# Patient Record
Sex: FEMALE | Race: WHITE | Employment: FULL TIME | URBAN - METROPOLITAN AREA
[De-identification: names, ages, dates, MRNs, and addresses within clinical notes are randomized per-mention and may not be internally consistent; named-entity substitution may affect disease eponyms.]

---

## 2017-02-23 ENCOUNTER — HOSPITAL ENCOUNTER (OUTPATIENT)
Age: 22
Setting detail: SPECIMEN
Discharge: HOME | End: 2017-02-23

## 2017-02-23 ENCOUNTER — NURSE ONLY (OUTPATIENT)
Dept: LAB | Age: 22
End: 2017-02-23

## 2017-02-23 ENCOUNTER — OFFICE VISIT (OUTPATIENT)
Dept: OBGYN | Age: 22
End: 2017-02-23

## 2017-02-23 VITALS
RESPIRATION RATE: 16 BRPM | HEIGHT: 60 IN | DIASTOLIC BLOOD PRESSURE: 80 MMHG | BODY MASS INDEX: 39.46 KG/M2 | SYSTOLIC BLOOD PRESSURE: 118 MMHG | WEIGHT: 201 LBS | HEART RATE: 76 BPM

## 2017-02-23 DIAGNOSIS — Z01.419 ENCOUNTER FOR ANNUAL ROUTINE GYNECOLOGICAL EXAMINATION: ICD-10-CM

## 2017-02-23 DIAGNOSIS — N92.1 METRORRHAGIA: Primary | ICD-10-CM

## 2017-02-23 DIAGNOSIS — N94.6 DYSMENORRHEA: Primary | ICD-10-CM

## 2017-02-23 LAB
DIRECT EXAM: NORMAL
HCG QUALITATIVE: NEGATIVE
Lab: NORMAL
SPECIMEN DESCRIPTION: NORMAL
SPECIMEN DESCRIPTION: NORMAL
STATUS: NORMAL

## 2017-02-23 PROCEDURE — 87800 DETECT AGNT MULT DNA DIREC: CPT

## 2017-02-23 PROCEDURE — 87491 CHLMYD TRACH DNA AMP PROBE: CPT

## 2017-02-23 PROCEDURE — 99395 PREV VISIT EST AGE 18-39: CPT | Performed by: OBSTETRICS & GYNECOLOGY

## 2017-02-23 PROCEDURE — 87070 CULTURE OTHR SPECIMN AEROBIC: CPT

## 2017-02-23 PROCEDURE — 36415 COLL VENOUS BLD VENIPUNCTURE: CPT

## 2017-02-23 PROCEDURE — 84703 CHORIONIC GONADOTROPIN ASSAY: CPT

## 2017-02-23 PROCEDURE — 87591 N.GONORRHOEAE DNA AMP PROB: CPT

## 2017-02-23 PROCEDURE — 96372 THER/PROPH/DIAG INJ SC/IM: CPT | Performed by: OBSTETRICS & GYNECOLOGY

## 2017-02-23 RX ORDER — MEDROXYPROGESTERONE ACETATE 150 MG/ML
150 INJECTION, SUSPENSION INTRAMUSCULAR
Status: CANCELLED | OUTPATIENT
Start: 2017-02-23

## 2017-02-23 RX ORDER — MEDROXYPROGESTERONE ACETATE 150 MG/ML
150 INJECTION, SUSPENSION INTRAMUSCULAR ONCE
Qty: 1 ML | Refills: 1
Start: 2017-02-23 | End: 2017-02-23 | Stop reason: SDUPTHER

## 2017-02-23 RX ORDER — MEDROXYPROGESTERONE ACETATE 150 MG/ML
150 INJECTION, SUSPENSION INTRAMUSCULAR
Status: COMPLETED | OUTPATIENT
Start: 2017-02-23 | End: 2017-05-15

## 2017-02-23 RX ADMIN — MEDROXYPROGESTERONE ACETATE 150 MG: 150 INJECTION, SUSPENSION INTRAMUSCULAR at 10:48

## 2017-02-23 ASSESSMENT — ENCOUNTER SYMPTOMS
NAUSEA: 0
PHOTOPHOBIA: 0
CHEST TIGHTNESS: 0
ABDOMINAL PAIN: 0
SHORTNESS OF BREATH: 0
BLOOD IN STOOL: 0
CONSTIPATION: 0
VOMITING: 0
BACK PAIN: 0
DIARRHEA: 0
COUGH: 0

## 2017-02-24 LAB
C TRACH DNA GENITAL QL NAA+PROBE: NEGATIVE
N. GONORRHOEAE DNA: NEGATIVE

## 2017-02-26 LAB
CULTURE: NORMAL
Lab: NORMAL
SPECIMEN DESCRIPTION: NORMAL
SPECIMEN DESCRIPTION: NORMAL
STATUS: NORMAL

## 2017-03-01 LAB — CYTOLOGY REPORT: NORMAL

## 2017-03-16 ENCOUNTER — PROCEDURE VISIT (OUTPATIENT)
Dept: OBGYN | Age: 22
End: 2017-03-16

## 2017-03-16 VITALS
HEART RATE: 84 BPM | SYSTOLIC BLOOD PRESSURE: 122 MMHG | DIASTOLIC BLOOD PRESSURE: 80 MMHG | WEIGHT: 199 LBS | RESPIRATION RATE: 16 BRPM | HEIGHT: 60 IN | BODY MASS INDEX: 39.07 KG/M2

## 2017-03-16 DIAGNOSIS — R87.612 LOW GRADE SQUAMOUS INTRAEPITH LESION ON CYTOLOGIC SMEAR CERVIX (LGSIL): Primary | ICD-10-CM

## 2017-03-16 PROCEDURE — 99213 OFFICE O/P EST LOW 20 MIN: CPT | Performed by: OBSTETRICS & GYNECOLOGY

## 2017-03-16 RX ORDER — MEDROXYPROGESTERONE ACETATE 150 MG/ML
150 INJECTION, SUSPENSION INTRAMUSCULAR
Status: ON HOLD | COMMUNITY
End: 2020-01-22

## 2017-05-02 ENCOUNTER — PROCEDURE VISIT (OUTPATIENT)
Dept: PODIATRY | Age: 22
End: 2017-05-02
Payer: COMMERCIAL

## 2017-05-02 VITALS
DIASTOLIC BLOOD PRESSURE: 80 MMHG | HEART RATE: 80 BPM | BODY MASS INDEX: 40.84 KG/M2 | SYSTOLIC BLOOD PRESSURE: 122 MMHG | WEIGHT: 208 LBS | HEIGHT: 60 IN

## 2017-05-02 DIAGNOSIS — L03.039 PARONYCHIA, TOE, UNSPECIFIED LATERALITY: ICD-10-CM

## 2017-05-02 DIAGNOSIS — L60.0 OC (ONYCHOCRYPTOSIS): Primary | ICD-10-CM

## 2017-05-02 DIAGNOSIS — L92.9 GRANULOMA OF GREAT TOE: ICD-10-CM

## 2017-05-02 PROCEDURE — 11750 EXCISION NAIL&NAIL MATRIX: CPT | Performed by: PODIATRIST

## 2017-05-15 ENCOUNTER — OFFICE VISIT (OUTPATIENT)
Dept: CARDIOLOGY | Age: 22
End: 2017-05-15
Payer: COMMERCIAL

## 2017-05-15 ENCOUNTER — OFFICE VISIT (OUTPATIENT)
Dept: FAMILY MEDICINE CLINIC | Age: 22
End: 2017-05-15
Payer: COMMERCIAL

## 2017-05-15 ENCOUNTER — NURSE ONLY (OUTPATIENT)
Dept: LAB | Age: 22
End: 2017-05-15
Payer: COMMERCIAL

## 2017-05-15 VITALS
DIASTOLIC BLOOD PRESSURE: 80 MMHG | RESPIRATION RATE: 18 BRPM | HEIGHT: 60 IN | BODY MASS INDEX: 40.84 KG/M2 | SYSTOLIC BLOOD PRESSURE: 122 MMHG | WEIGHT: 208 LBS

## 2017-05-15 VITALS
HEART RATE: 78 BPM | BODY MASS INDEX: 40.37 KG/M2 | SYSTOLIC BLOOD PRESSURE: 130 MMHG | DIASTOLIC BLOOD PRESSURE: 78 MMHG | HEIGHT: 60 IN | WEIGHT: 205.6 LBS

## 2017-05-15 DIAGNOSIS — Z20.2 POSSIBLE EXPOSURE TO STD: ICD-10-CM

## 2017-05-15 DIAGNOSIS — Z09 CARDIOLOGY FOLLOW-UP ENCOUNTER: Primary | ICD-10-CM

## 2017-05-15 DIAGNOSIS — N94.6 DYSMENORRHEA: Primary | ICD-10-CM

## 2017-05-15 DIAGNOSIS — E55.9 VITAMIN D DEFICIENCY: ICD-10-CM

## 2017-05-15 DIAGNOSIS — E03.9 HYPOTHYROIDISM, UNSPECIFIED TYPE: ICD-10-CM

## 2017-05-15 DIAGNOSIS — J30.9 ALLERGIC RHINITIS, UNSPECIFIED ALLERGIC RHINITIS TRIGGER, UNSPECIFIED RHINITIS SEASONALITY: ICD-10-CM

## 2017-05-15 DIAGNOSIS — E88.81 INSULIN RESISTANCE: ICD-10-CM

## 2017-05-15 DIAGNOSIS — Z11.59 NEED FOR HEPATITIS C SCREENING TEST: ICD-10-CM

## 2017-05-15 DIAGNOSIS — E78.5 HYPERLIPIDEMIA, UNSPECIFIED HYPERLIPIDEMIA TYPE: ICD-10-CM

## 2017-05-15 DIAGNOSIS — E03.8 OTHER SPECIFIED HYPOTHYROIDISM: ICD-10-CM

## 2017-05-15 DIAGNOSIS — E78.00 HYPERCHOLESTEROLEMIA: Primary | ICD-10-CM

## 2017-05-15 PROCEDURE — G8417 CALC BMI ABV UP PARAM F/U: HCPCS | Performed by: NURSE PRACTITIONER

## 2017-05-15 PROCEDURE — 99213 OFFICE O/P EST LOW 20 MIN: CPT | Performed by: NURSE PRACTITIONER

## 2017-05-15 PROCEDURE — 1036F TOBACCO NON-USER: CPT | Performed by: INTERNAL MEDICINE

## 2017-05-15 PROCEDURE — G8427 DOCREV CUR MEDS BY ELIG CLIN: HCPCS | Performed by: INTERNAL MEDICINE

## 2017-05-15 PROCEDURE — G8417 CALC BMI ABV UP PARAM F/U: HCPCS | Performed by: INTERNAL MEDICINE

## 2017-05-15 PROCEDURE — 93000 ELECTROCARDIOGRAM COMPLETE: CPT | Performed by: INTERNAL MEDICINE

## 2017-05-15 PROCEDURE — 96372 THER/PROPH/DIAG INJ SC/IM: CPT | Performed by: NURSE PRACTITIONER

## 2017-05-15 PROCEDURE — G8427 DOCREV CUR MEDS BY ELIG CLIN: HCPCS | Performed by: NURSE PRACTITIONER

## 2017-05-15 PROCEDURE — 99213 OFFICE O/P EST LOW 20 MIN: CPT | Performed by: INTERNAL MEDICINE

## 2017-05-15 PROCEDURE — 1036F TOBACCO NON-USER: CPT | Performed by: NURSE PRACTITIONER

## 2017-05-15 RX ORDER — ATORVASTATIN CALCIUM 40 MG/1
40 TABLET, FILM COATED ORAL DAILY
Qty: 30 TABLET | Refills: 12 | Status: ON HOLD | OUTPATIENT
Start: 2017-05-15 | End: 2020-01-22

## 2017-05-15 RX ADMIN — MEDROXYPROGESTERONE ACETATE 150 MG: 150 INJECTION, SUSPENSION INTRAMUSCULAR at 09:14

## 2017-05-15 ASSESSMENT — ENCOUNTER SYMPTOMS
SHORTNESS OF BREATH: 0
COUGH: 0
ROS SKIN COMMENTS: HAIR LOSS
WHEEZING: 0

## 2017-05-15 ASSESSMENT — PATIENT HEALTH QUESTIONNAIRE - PHQ9
SUM OF ALL RESPONSES TO PHQ9 QUESTIONS 1 & 2: 1
SUM OF ALL RESPONSES TO PHQ QUESTIONS 1-9: 1
2. FEELING DOWN, DEPRESSED OR HOPELESS: 0
1. LITTLE INTEREST OR PLEASURE IN DOING THINGS: 1

## 2017-05-16 ENCOUNTER — TELEPHONE (OUTPATIENT)
Dept: FAMILY MEDICINE CLINIC | Age: 22
End: 2017-05-16

## 2017-05-16 DIAGNOSIS — E55.9 VITAMIN D DEFICIENCY: Primary | ICD-10-CM

## 2017-05-16 DIAGNOSIS — K21.9 GASTROESOPHAGEAL REFLUX DISEASE WITHOUT ESOPHAGITIS: ICD-10-CM

## 2017-05-16 DIAGNOSIS — E88.81 INSULIN RESISTANCE: ICD-10-CM

## 2017-05-16 RX ORDER — ERGOCALCIFEROL 1.25 MG/1
50000 CAPSULE ORAL WEEKLY
Qty: 12 CAPSULE | Refills: 0 | Status: ON HOLD | OUTPATIENT
Start: 2017-05-16 | End: 2020-01-22

## 2017-05-16 RX ORDER — METFORMIN HYDROCHLORIDE 500 MG/1
500 TABLET, EXTENDED RELEASE ORAL 2 TIMES DAILY
Qty: 30 TABLET | Refills: 5 | Status: SHIPPED | OUTPATIENT
Start: 2017-05-16 | End: 2018-06-18

## 2017-05-16 RX ORDER — OMEPRAZOLE 20 MG/1
CAPSULE, DELAYED RELEASE ORAL
Qty: 30 CAPSULE | Refills: 11 | Status: ON HOLD | OUTPATIENT
Start: 2017-05-16 | End: 2020-01-22

## 2017-08-11 ENCOUNTER — NURSE ONLY (OUTPATIENT)
Dept: LAB | Age: 22
End: 2017-08-11
Payer: COMMERCIAL

## 2017-08-11 DIAGNOSIS — N94.6 DYSMENORRHEA: Primary | ICD-10-CM

## 2017-08-11 PROCEDURE — 96372 THER/PROPH/DIAG INJ SC/IM: CPT | Performed by: OBSTETRICS & GYNECOLOGY

## 2017-08-11 RX ORDER — MEDROXYPROGESTERONE ACETATE 150 MG/ML
150 INJECTION, SUSPENSION INTRAMUSCULAR
Status: COMPLETED | OUTPATIENT
Start: 2017-08-11 | End: 2017-11-10

## 2017-08-11 RX ADMIN — MEDROXYPROGESTERONE ACETATE 150 MG: 150 INJECTION, SUSPENSION INTRAMUSCULAR at 09:14

## 2017-11-03 ENCOUNTER — TELEPHONE (OUTPATIENT)
Dept: PRIMARY CARE CLINIC | Age: 22
End: 2017-11-03

## 2017-11-10 ENCOUNTER — NURSE ONLY (OUTPATIENT)
Dept: LAB | Age: 22
End: 2017-11-10
Payer: COMMERCIAL

## 2017-11-10 DIAGNOSIS — N94.6 DYSMENORRHEA: Primary | ICD-10-CM

## 2017-11-10 PROCEDURE — 96372 THER/PROPH/DIAG INJ SC/IM: CPT | Performed by: OBSTETRICS & GYNECOLOGY

## 2017-11-10 RX ADMIN — MEDROXYPROGESTERONE ACETATE 150 MG: 150 INJECTION, SUSPENSION INTRAMUSCULAR at 08:18

## 2017-11-16 ENCOUNTER — PROCEDURE VISIT (OUTPATIENT)
Dept: PODIATRY | Age: 22
End: 2017-11-16
Payer: COMMERCIAL

## 2017-11-16 VITALS
SYSTOLIC BLOOD PRESSURE: 126 MMHG | BODY MASS INDEX: 39.35 KG/M2 | WEIGHT: 208.4 LBS | HEIGHT: 61 IN | HEART RATE: 80 BPM | DIASTOLIC BLOOD PRESSURE: 70 MMHG

## 2017-11-16 DIAGNOSIS — L60.0 OC (ONYCHOCRYPTOSIS): Primary | ICD-10-CM

## 2017-11-16 DIAGNOSIS — M79.674 PAIN OF TOE OF RIGHT FOOT: ICD-10-CM

## 2017-11-16 PROCEDURE — 11750 EXCISION NAIL&NAIL MATRIX: CPT | Performed by: PODIATRIST

## 2017-11-16 NOTE — PATIENT INSTRUCTIONS
benefit to you is greater than the risk of side effects. Many people using this medication do not have serious side effects. Tell your doctor immediately if any of these rare but serious side effects occur: signs of infection (such as fever, chills, persistent sore throat), easy bruising/bleeding, signs of anemia (such as unusual tiredness/weakness, rapid breathing, fast heartbeat), change in the amount of urine, pink/bloody urine, signs of liver problems (such as stomach/abdominal pain, persistent nausea, vomiting, dark urine, yellowing eyes/skin), mental/mood changes. A very serious allergic reaction to this drug is rare. However, seek immediate medical attention if you notice any symptoms of a serious allergic reaction, including: rash, itching/swelling (especially of the face/tongue/throat), severe dizziness, trouble breathing. This is not a complete list of possible side effects.  If you notice other effects not listed above, contact your doctor or pharmacist.

## 2017-11-16 NOTE — PROGRESS NOTES
for a phenol matrixectomy nail procedure on the right, medial, hallux. Verbal and signed consent took place. A total of 3cc 1% lidocaine plain was used to anesthetize the right, medial, hallux. It was then prepped and draped in the usual sterile manner. Anesthesia was checked and was adequate. The right, medial, hallux nail border and matrix was removed. No remaining nail spicules. Three consecutive 30 seconds applications of 76% phenol were then applied to the nail matrix with an applicator. Rinsed with normal saline. A dry sterile dressing of silvadene or antibiotic ointment with telfa and coban took place. Patient will leave dry and intact for 24 hours then start soaking bid in warm soapy water or epsom salts then apply the ointment and a band aid for the first week then continue once per day for the second week. Patient will use OTC anti-inflammatory or tylenol PRN for pain. Post nail procedure instructions were given. Any signs of infections and patient should be see back in the office immediately.

## 2017-11-22 ENCOUNTER — HOSPITAL ENCOUNTER (EMERGENCY)
Age: 22
Discharge: HOME | End: 2017-11-22
Attending: EMERGENCY MEDICINE
Payer: COMMERCIAL

## 2017-11-22 VITALS
SYSTOLIC BLOOD PRESSURE: 126 MMHG | HEART RATE: 96 BPM | BODY MASS INDEX: 40.84 KG/M2 | HEIGHT: 60 IN | TEMPERATURE: 97.9 F | WEIGHT: 208 LBS | RESPIRATION RATE: 14 BRPM | OXYGEN SATURATION: 98 % | DIASTOLIC BLOOD PRESSURE: 72 MMHG

## 2017-11-22 DIAGNOSIS — M77.8 LEFT WRIST TENDINITIS: Primary | ICD-10-CM

## 2017-11-22 PROCEDURE — 99282 EMERGENCY DEPT VISIT SF MDM: CPT

## 2017-11-22 RX ORDER — IBUPROFEN 600 MG/1
600 TABLET ORAL EVERY 6 HOURS PRN
Qty: 30 TABLET | Refills: 0 | Status: SHIPPED | OUTPATIENT
Start: 2017-11-22 | End: 2018-06-18

## 2017-11-22 ASSESSMENT — PAIN DESCRIPTION - PROGRESSION: CLINICAL_PROGRESSION: GRADUALLY WORSENING

## 2017-11-22 ASSESSMENT — PAIN DESCRIPTION - FREQUENCY: FREQUENCY: CONTINUOUS

## 2017-11-22 ASSESSMENT — PAIN DESCRIPTION - PAIN TYPE: TYPE: ACUTE PAIN

## 2017-11-22 ASSESSMENT — PAIN DESCRIPTION - ORIENTATION: ORIENTATION: LEFT

## 2017-11-22 ASSESSMENT — PAIN DESCRIPTION - LOCATION: LOCATION: WRIST

## 2017-11-22 ASSESSMENT — PAIN DESCRIPTION - DESCRIPTORS: DESCRIPTORS: SHARP

## 2017-11-22 ASSESSMENT — PAIN DESCRIPTION - ONSET: ONSET: SUDDEN

## 2017-11-22 ASSESSMENT — PAIN SCALES - GENERAL: PAINLEVEL_OUTOF10: 9
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
## 2017-11-22 NOTE — LETTER
[address] [facility] ED  [facility]  Phone: [phone]               November 22, 2017    Patient: Ananya Donald   YOB: 1995   Date of Visit: 11/22/2017       To Whom It May Concern:    Lyly Crowder was seen and treated in our emergency department on 11/22/2017.        Sincerely,       Mary Garcia MD         Signature:__________________________________

## 2018-06-18 ENCOUNTER — OFFICE VISIT (OUTPATIENT)
Dept: CARDIOLOGY | Age: 23
End: 2018-06-18
Payer: COMMERCIAL

## 2018-06-18 VITALS
BODY MASS INDEX: 43.19 KG/M2 | HEIGHT: 60 IN | HEART RATE: 110 BPM | SYSTOLIC BLOOD PRESSURE: 110 MMHG | WEIGHT: 220 LBS | DIASTOLIC BLOOD PRESSURE: 70 MMHG

## 2018-06-18 DIAGNOSIS — Q87.19 NOONAN SYNDROME: ICD-10-CM

## 2018-06-18 DIAGNOSIS — E78.5 HYPERLIPIDEMIA, UNSPECIFIED HYPERLIPIDEMIA TYPE: ICD-10-CM

## 2018-06-18 DIAGNOSIS — I21.4 NSTEMI (NON-ST ELEVATED MYOCARDIAL INFARCTION) (HCC): Primary | ICD-10-CM

## 2018-06-18 DIAGNOSIS — R60.0 BILATERAL LEG EDEMA: ICD-10-CM

## 2018-06-18 DIAGNOSIS — E78.00 HYPERCHOLESTEROLEMIA: ICD-10-CM

## 2018-06-18 PROCEDURE — 93000 ELECTROCARDIOGRAM COMPLETE: CPT | Performed by: INTERNAL MEDICINE

## 2018-06-18 PROCEDURE — G8417 CALC BMI ABV UP PARAM F/U: HCPCS | Performed by: INTERNAL MEDICINE

## 2018-06-18 PROCEDURE — G8599 NO ASA/ANTIPLAT THER USE RNG: HCPCS | Performed by: INTERNAL MEDICINE

## 2018-06-18 PROCEDURE — 99213 OFFICE O/P EST LOW 20 MIN: CPT | Performed by: INTERNAL MEDICINE

## 2018-06-18 PROCEDURE — G8427 DOCREV CUR MEDS BY ELIG CLIN: HCPCS | Performed by: INTERNAL MEDICINE

## 2018-06-18 PROCEDURE — 4004F PT TOBACCO SCREEN RCVD TLK: CPT | Performed by: INTERNAL MEDICINE

## 2018-06-18 RX ORDER — FUROSEMIDE 20 MG/1
TABLET ORAL
Refills: 1 | Status: ON HOLD | COMMUNITY
Start: 2018-06-05 | End: 2020-01-22

## 2018-06-18 RX ORDER — ARIPIPRAZOLE 2 MG/1
TABLET ORAL
Refills: 2 | Status: ON HOLD | COMMUNITY
Start: 2018-05-23 | End: 2018-12-07 | Stop reason: HOSPADM

## 2018-06-18 RX ORDER — NAPROXEN 500 MG/1
500 TABLET ORAL PRN
Status: ON HOLD | COMMUNITY
End: 2020-01-22

## 2018-06-18 RX ORDER — CITALOPRAM 20 MG/1
TABLET ORAL
Refills: 2 | Status: ON HOLD | COMMUNITY
Start: 2018-05-23 | End: 2018-12-07 | Stop reason: HOSPADM

## 2018-06-18 RX ORDER — NAPROXEN 250 MG/1
250 TABLET ORAL PRN
COMMUNITY
End: 2018-06-18

## 2018-06-21 ENCOUNTER — HOSPITAL ENCOUNTER (OUTPATIENT)
Dept: NON INVASIVE DIAGNOSTICS | Age: 23
Discharge: HOME | End: 2018-06-21
Payer: COMMERCIAL

## 2018-06-21 DIAGNOSIS — R60.0 BILATERAL LEG EDEMA: ICD-10-CM

## 2018-06-21 DIAGNOSIS — E78.5 HYPERLIPIDEMIA, UNSPECIFIED HYPERLIPIDEMIA TYPE: ICD-10-CM

## 2018-06-21 DIAGNOSIS — Q87.19 NOONAN SYNDROME: ICD-10-CM

## 2018-06-21 DIAGNOSIS — E78.00 HYPERCHOLESTEROLEMIA: ICD-10-CM

## 2018-06-21 DIAGNOSIS — I21.4 NSTEMI (NON-ST ELEVATED MYOCARDIAL INFARCTION) (HCC): ICD-10-CM

## 2018-06-21 LAB
LV EF: 68 %
LVEF MODALITY: NORMAL

## 2018-06-21 PROCEDURE — 93306 TTE W/DOPPLER COMPLETE: CPT

## 2018-12-02 PROBLEM — F32.2 MAJOR DEPRESSIVE DISORDER, SEVERE (HCC): Status: ACTIVE | Noted: 2018-12-02

## 2018-12-03 ENCOUNTER — HOSPITAL ENCOUNTER (INPATIENT)
Age: 23
LOS: 4 days | Discharge: HOME | DRG: 885 | End: 2018-12-07
Attending: PSYCHIATRY & NEUROLOGY | Admitting: PSYCHIATRY & NEUROLOGY
Payer: COMMERCIAL

## 2018-12-03 PROBLEM — F31.30 BIPOLAR I DISORDER, MOST RECENT EPISODE DEPRESSED (HCC): Chronic | Status: ACTIVE | Noted: 2018-12-02

## 2018-12-03 PROBLEM — F32.2 MAJOR DEPRESSIVE DISORDER, SEVERE (HCC): Chronic | Status: ACTIVE | Noted: 2018-12-02

## 2018-12-03 PROBLEM — F31.30 BIPOLAR I DISORDER, MOST RECENT EPISODE DEPRESSED (HCC): Status: ACTIVE | Noted: 2018-12-02

## 2018-12-03 PROCEDURE — 6370000000 HC RX 637 (ALT 250 FOR IP): Performed by: PSYCHIATRY & NEUROLOGY

## 2018-12-03 PROCEDURE — 6360000002 HC RX W HCPCS: Performed by: NURSE PRACTITIONER

## 2018-12-03 PROCEDURE — 90792 PSYCH DIAG EVAL W/MED SRVCS: CPT | Performed by: PSYCHIATRY & NEUROLOGY

## 2018-12-03 PROCEDURE — 1240000000 HC EMOTIONAL WELLNESS R&B

## 2018-12-03 RX ORDER — LORAZEPAM 2 MG/ML
1 INJECTION INTRAMUSCULAR EVERY 4 HOURS PRN
Status: DISCONTINUED | OUTPATIENT
Start: 2018-12-03 | End: 2018-12-07 | Stop reason: HOSPADM

## 2018-12-03 RX ORDER — HYDROXYZINE 50 MG/1
50 TABLET, FILM COATED ORAL 3 TIMES DAILY PRN
Status: DISCONTINUED | OUTPATIENT
Start: 2018-12-03 | End: 2018-12-03 | Stop reason: DRUGHIGH

## 2018-12-03 RX ORDER — ACETAMINOPHEN 325 MG/1
325 TABLET ORAL EVERY 8 HOURS PRN
Status: DISCONTINUED | OUTPATIENT
Start: 2018-12-03 | End: 2018-12-07 | Stop reason: HOSPADM

## 2018-12-03 RX ORDER — HALOPERIDOL 5 MG/ML
5 INJECTION INTRAMUSCULAR EVERY 4 HOURS PRN
Status: DISCONTINUED | OUTPATIENT
Start: 2018-12-03 | End: 2018-12-07 | Stop reason: HOSPADM

## 2018-12-03 RX ORDER — ARIPIPRAZOLE 5 MG/1
5 TABLET ORAL DAILY
Status: DISCONTINUED | OUTPATIENT
Start: 2018-12-03 | End: 2018-12-07 | Stop reason: HOSPADM

## 2018-12-03 RX ORDER — TRAZODONE HYDROCHLORIDE 50 MG/1
50 TABLET ORAL NIGHTLY PRN
Status: DISCONTINUED | OUTPATIENT
Start: 2018-12-03 | End: 2018-12-07 | Stop reason: HOSPADM

## 2018-12-03 RX ORDER — SIMVASTATIN 40 MG
40 TABLET ORAL NIGHTLY
Status: DISCONTINUED | OUTPATIENT
Start: 2018-12-03 | End: 2018-12-07 | Stop reason: HOSPADM

## 2018-12-03 RX ORDER — LORAZEPAM 2 MG/ML
1 INJECTION INTRAMUSCULAR EVERY 4 HOURS
Status: DISCONTINUED | OUTPATIENT
Start: 2018-12-03 | End: 2018-12-03

## 2018-12-03 RX ORDER — NICOTINE 21 MG/24HR
1 PATCH, TRANSDERMAL 24 HOURS TRANSDERMAL DAILY
Status: DISCONTINUED | OUTPATIENT
Start: 2018-12-03 | End: 2018-12-07 | Stop reason: HOSPADM

## 2018-12-03 RX ORDER — HYDROXYZINE HYDROCHLORIDE 25 MG/1
25 TABLET, FILM COATED ORAL 3 TIMES DAILY PRN
Status: DISCONTINUED | OUTPATIENT
Start: 2018-12-03 | End: 2018-12-07 | Stop reason: HOSPADM

## 2018-12-03 RX ORDER — OMEPRAZOLE 20 MG/1
20 CAPSULE, DELAYED RELEASE ORAL DAILY
Status: DISCONTINUED | OUTPATIENT
Start: 2018-12-03 | End: 2018-12-07 | Stop reason: HOSPADM

## 2018-12-03 RX ADMIN — HALOPERIDOL LACTATE 5 MG: 5 INJECTION, SOLUTION INTRAMUSCULAR at 01:41

## 2018-12-03 RX ADMIN — HALOPERIDOL LACTATE 5 MG: 5 INJECTION, SOLUTION INTRAMUSCULAR at 16:32

## 2018-12-03 RX ADMIN — SIMVASTATIN 40 MG: 40 TABLET, FILM COATED ORAL at 22:54

## 2018-12-03 RX ADMIN — LORAZEPAM 1 MG: 2 INJECTION INTRAMUSCULAR; INTRAVENOUS at 01:41

## 2018-12-03 RX ADMIN — HYDROXYZINE HYDROCHLORIDE 25 MG: 25 TABLET, FILM COATED ORAL at 12:30

## 2018-12-03 RX ADMIN — VORTIOXETINE 10 MG: 10 TABLET, FILM COATED ORAL at 12:30

## 2018-12-03 RX ADMIN — LORAZEPAM 1 MG: 2 INJECTION INTRAMUSCULAR; INTRAVENOUS at 16:32

## 2018-12-03 RX ADMIN — ARIPIPRAZOLE 5 MG: 5 TABLET ORAL at 12:30

## 2018-12-03 RX ADMIN — OMEPRAZOLE 20 MG: 20 CAPSULE, DELAYED RELEASE ORAL at 12:30

## 2018-12-03 ASSESSMENT — LIFESTYLE VARIABLES: HISTORY_ALCOHOL_USE: NO

## 2018-12-03 NOTE — H&P
hypothyroidism.  Other Father         (posstible Simón syndrome).  Elevated Lipids Mother     Diabetes Mother         type 2.    Osteoarthritis Mother     Heart Disease Other     Other Other         mental retardation and developmental delay on both maternal & paternal sides.  Asthma Brother     Thyroid Disease Brother         hypothyroidism    Other Brother         insulin resistance    Other Brother         acid reflux    Allergic Rhinitis Brother     Glaucoma Neg Hx        SOCIAL HISTORY       Social History     Social History    Marital status: Single     Spouse name: N/A    Number of children: N/A    Years of education: N/A     Occupational History     [employer]     Social History Main Topics    Smoking status: Current Every Day Smoker     Packs/day: 0.25     Years: 2.00     Types: Cigarettes    Smokeless tobacco: Former User     Types: Chew    Alcohol use 0.0 oz/week      Comment: social    Drug use: No      Comment: hasnt smoked pot in 2 weeks    Sexual activity: Yes     Partners: Male     Birth control/ protection: Injection      Comment: Partner x 6 months     Other Topics Concern    Not on file     Social History Narrative    ** Merged History Encounter **                REVIEW OF SYSTEMS      Allergies   Allergen Reactions    Adderall [Amphetamine-Dextroamphetamine] Other (See Comments)     Tremors.  Concerta [Methylphenidate] Other (See Comments)     Was not effective.  Strattera [Atomoxetine] Other (See Comments)     Weight gain and sleepiness. No current facility-administered medications on file prior to encounter.       Current Outpatient Prescriptions on File Prior to Encounter   Medication Sig Dispense Refill    naproxen (NAPROSYN) 500 MG tablet Take 500 mg by mouth as needed       ARIPiprazole (ABILIFY) 2 MG tablet TAKE 1 TABLET(S) BY MOUTH 1 TIME A DAY AT BEDTIME  2    citalopram (CELEXA) 20 MG tablet TAKE 1 TABLET(S) BY MOUTH 1 TIME Lymphadenopathy. LOCOMOTOR, BACK AND SPINE:  No tenderness or deformities. EXTREMITIES:  Symmetrical, no pretibial edema. Michaels sign negative. No discoloration or ulcerations. NEUROLOGIC:  The patient is conscious, alert, confused. Moves very slowly answers questions very slowly. No apparent focal sensory deficits. No motor deficits, muscle strength equal [place]. No facial droop, tongue protrudes centrally, no slurring of the speech. PROVISIONAL DIAGNOSES:      Principal Problem:    Bipolar I disorder, most recent episode depressed ([facility]  Resolved Problems:    * No resolved hospital problems.  Davide FELTON PA-C on 12/3/2018 at 5:46 PM

## 2018-12-03 NOTE — BH NOTE
Patient appears confused and disoriented. Thought blocking and response lag during 1:1 interaction. Needs frequent redirection regarding poor boundaries (touching peers), intrusiveness, and entering peers rooms (rambling with their belongings). Patient is receptive of redirection without further incident. Observed with bizarre behaviors and gestures. Appears preoccupied and responding. Reassurance and reorientation provided. Dr. Naye Peck present and notified. New order for 1:1 observation initiated. Writer did notify HUB and Admission/Charge Nurse.

## 2018-12-03 NOTE — PROGRESS NOTES
Patient given tobacco quitline number 61182085236 at this time, refusing to call at this time, states \" I just dont want to quit now\"- patient given information as to the dangers of long term tobacco use. Continue to reinforce the importance of tobacco cessation. Visit Information Date & Time Provider Department Dept. Phone Encounter #  
 10/2/2017  1:30 PM [facility] [address] [id number] Follow-up Instructions Return in about 2 weeks (around 10/16/2017) for [place]- Thursday please. Your Appointments 10/19/2017  9:15 AM  
Follow Up with Luis Reyes [place] [address] [facility] ([address]) Appt Note: 2wk f/up [facility], [address] [facility] [address]  
  
   
 [facility], [address] [place] [address] Upcoming Health Maintenance Date Due Hepatitis C Screening 1952 HEMOGLOBIN A1C Q6M 1952 LIPID PANEL Q1 1952 FOOT EXAM Q1 10/8/1963 MICROALBUMIN Q1 10/8/1963 EYE EXAM RETINAL OR DILATED Q1 10/8/1963 Pneumococcal 19-64 Medium Risk (1 of 1 - PPSV23) 10/8/1972 DTaP/Tdap/Td series (1 - Tdap) 10/8/1974 PAP AKA CERVICAL CYTOLOGY 10/8/1974 BREAST CANCER SCRN MAMMOGRAM 10/8/2003 FOBT Q 1 YEAR AGE 50-75 10/8/2003 ZOSTER VACCINE AGE 60> 8/8/2013 INFLUENZA AGE 9 TO ADULT 8/1/2017 Allergies as of 10/2/2017  Review Complete On: 10/2/2017 By: Bo Mcdaniel No Known Allergies Current Immunizations  Never Reviewed No immunizations on file. Not reviewed this visit You Were Diagnosed With   
  
 Codes Comments Autoimmune hepatitis ([facility]    -  Primary ICD-10-CM: K75.4 ICD-9-CM: 571.42 Vitals BP Pulse Temp Resp Height(growth percentile) 189/73 (BP 1 Location: Right arm, BP Patient Position: Sitting) 87 98.3 °F (36.8 °C) (Tympanic) 16 5' 3\" (1.6 m) Weight(growth percentile) SpO2 BMI OB Status Smoking Status 303 lb (137.4 kg) 100% 53.67 kg/m2 Menopause Never Smoker BMI and BSA Data Body Mass Index Body Surface Area  
 53.67 kg/m 2 2.47 m 2 Preferred Pharmacy Pharmacy Name Phone [id number]- [place], [address] [facility] Micky Anderson Your Updated Medication List  
  
   
This list is accurate as of: 10/2/17  2:20 PM.  Always use your most recent med list. amLODIPine 2.5 mg tablet Commonly known as:  NORVASC  
  
 azaTHIOprine 50 mg tablet Commonly known as:  [employer] Take 2 Tabs by mouth daily. doxazosin 2 mg tablet Commonly known as:  CARDURA TAKE 1 TABLET BY MOUTH DAILY  
  
 epinastine 0.05 % Drop  
  
 famotidine 20 mg tablet Commonly known as:  PEPCID  
  
 furosemide 40 mg tablet Commonly known as:  LASIX Take 1 Tab by mouth daily. HumaLOG KwikPen 100 unit/mL kwikpen Generic drug:  insulin lispro INJECT 2 UNITS SUBCUTANEOUSLY 3 TIMES A DAY WITH A MEAL. LANTUS SOLOSTAR 100 unit/mL (3 mL) Inpn Generic drug:  insulin glargine  
  
 predniSONE 20 mg tablet Commonly known as:  Kike Felling Take 2 Tabs by mouth daily. * spironolactone 25 mg tablet Commonly known as:  ALDACTONE  
  
 * spironolactone 100 mg tablet Commonly known as:  ALDACTONE Take 1 Tab by mouth daily. tacrolimus 1 mg capsule Commonly known as:  PROGRAF Take 2 Caps by mouth every twelve (12) hours. Indications: autoimmune hepatitis * Notice: This list has 2 medication(s) that are the same as other medications prescribed for you. Read the directions carefully, and ask your doctor or other care provider to review them with you. Prescriptions Sent to Pharmacy Refills  
 tacrolimus (PROGRAF) 1 mg capsule 3 Sig: Take 2 Caps by mouth every twelve (12) hours. Indications: autoimmune hepatitis Class: Normal  
 Pharmacy: [facility]/pharmacy #[id number]- [place], [address] [facility] [address]  #: [phone] Route: Oral  
  
Follow-up Instructions Return in about 2 weeks (around 10/16/2017) for [facility]- Thursday please. To-Do List   
 10/02/2017 Lab:  CBC WITH AUTOMATED DIFF   
  
 10/02/2017 Lab:  HEPATIC FUNCTION PANEL   
  
 10/02/2017 Lab:  METABOLIC PANEL, BASIC   
  
 10/02/2017 Lab:  TACROLIMUS, WHOLE BLOOD Introducing [facility]! [employer] introduces [employer]ovi patient portal. Now you can access parts of your medical record, email your doctor's office, and request medication refills online. 1. In your internet browser, go to [email or web address] 2. Click on the First Time User? Click Here link in the Sign In box. You will see the New Member Sign Up page. 3. Enter your Nanovi Access Code exactly as it appears below. You will not need to use this code after youve completed the sign-up process. If you do not sign up before the expiration date, you must request a new code. · Nanovi Access Code: [email or web address] Expires: 12/31/2017  2:20 PM 
 
4. Enter the last four digits of your Social Security Number (xxxx) and Date of Birth (mm/dd/yyyy) as indicated and click Submit. You will be taken to the next sign-up page. 5. Create a Nanovi ID. This will be your Nanovi login ID and cannot be changed, so think of one that is secure and easy to remember. 6. Create a Nanovi password. You can change your password at any time. 7. Enter your Password Reset Question and Answer. This can be used at a later time if you forget your password. 8. Enter your e-mail address. You will receive e-mail notification when new information is available in [address] 9. Click Sign Up. You can now view and download portions of your medical record. 10. Click the Download Summary menu link to download a portable copy of your medical information. If you have questions, please visit the Frequently Asked Questions section of the Nanovi website. Remember, Nanovi is NOT to be used for urgent needs. For medical emergencies, dial 911. Now available from your iPhone and Android! Please provide this summary of care documentation to your next provider. Your primary care clinician is listed as Trung Li. If you have any questions after today's visit, please call [phone].

## 2018-12-03 NOTE — BH NOTE
Psychiatric Admission Note         Matthew Snyder is a 26 y.o. female who was admitted from emergency department. She is feeling depressed and sad. She decreased psychomotor activity. She has poor eye contact. She has poor rapport. Her speech is very slow. She feels that her life is waste and she should kill herself and die. She feels that she has no support from anybody. She feels that she has no motivation or energy. She is responding to internal stimuli. She did not tell me about the contents of the voices she is hearing. She is guarded and withdrawn. Past Psychiatric History   Patient is currently getting treatment from some other source. She did not tell me where she was going per her follow-up. She had previous suicide attempt. She denies any drug and alcohol use. Antolin Peralta History of Substance Abuse     She denies any drug and alcohol use. Family History of psychiatric disorders    Family history:     Patient denies any family history mental illness suicidal drug and I'll call abuse. She said that she is living with her parents. She has a brother and a sister. She finished her high school. She denies any physical sexual or emotional abuse in her. She has history of previous attempt at suicide. She denies any legal problems. She denies any alf or intermediate time. Medical History   Allergies: Adderall [amphetamine-dextroamphetamine];  Concerta [methylphenidate]; and Strattera [atomoxetine]   Past Medical History:   Diagnosis Date    ADHD (attention deficit hyperactivity disorder)     (with learning delays)    Allergic rhinitis     Dysmetabolic syndrome X     (Dr. Yogesh Diaz)    Dysthymic disorder     Hyperlipidemia     Insulin resistance     Juvenile seizure disorder ([facility]     none actively    Keratosis pilaris     Mental retardation     mild    [place] syndrome     Other specified acquired hypothyroidism     (Dr. Esthela Nichols, [place])   [facility] Other specified congenital anomalies     (Dr. Josiah Litten, [facility])     Reflux     Scoliosis     (mild)    Seizures (HCC)     fever induced 2000    Vitamin D deficiency       No past surgical history on file. SOCIAL HISTORY  Social History     Social History    Marital status: Single     Spouse name: N/A    Number of children: N/A    Years of education: N/A     Occupational History     [employer]     Social History Main Topics    Smoking status: Current Every Day Smoker     Packs/day: 0.25     Years: 2.00     Types: Cigarettes    Smokeless tobacco: Former User     Types: Chew    Alcohol use 0.0 oz/week      Comment: social    Drug use: No      Comment: hasnt smoked pot in 2 weeks    Sexual activity: Yes     Partners: Male     Birth control/ protection: Injection      Comment: Partner x 6 months     Other Topics Concern    Not on file     Social History Narrative    ** Merged History Encounter **              Mental Status  Pt. was alert, fully oriented, and cooperative. Patient is cooperative. She has poor eye contact. She has adequate rapport. Her speech is within normal limits. Her psychomotor activity decreased. Her mood subjectively sad. Objectively appears to be depressed and has appropriate affect. Thought process within normal limits. Thought content predominantly of depression sadness lack of energy and motivation. She has suicidal thoughts. She feels that her life is a waste and she should kill herself by overdose of medication. She denies any hallucinations or delusions. She denies any homicidal thoughts or plans. She is of average intelligence. She is oriented to time place and person. Her memory to recent remote and immediate events are within normal limits. She has poor attention and concentration. Her insight and judgment are impaired. Her abstraction is fair.     Diagnostic Impression  Principal Problem:    Bipolar I disorder, most recent episode depressed

## 2018-12-03 NOTE — PROGRESS NOTES

## 2018-12-03 NOTE — PLAN OF CARE
Problem: Altered Mood, Psychotic Behavior:  Goal: Able to verbalize reality based thinking  Able to verbalize reality based thinking   Outcome: Ongoing  Pt did not participate in Therapeutic Leisure Skills Group at 1100 despite staff encouragement.

## 2018-12-03 NOTE — BH NOTE
No  Attention: Distractible  Thought Processes: Circumstantial  Thought Content:Normal: No  Thought Content: Preoccupations, Poverty of Content  Hallucinations: None  Delusions: No  Memory:Normal: No  Memory: Poor Remote, Poor Recent  Insight and Judgment: No  Insight and Judgment: Poor Insight, Poor Judgment  Present Suicidal Ideation: No  Present Homicidal Ideation: No    Tobacco Screening:  Practical Counseling, on admission, samira X, if applicable and completed (first 3 are required if patient doesn't refuse):            ( )  Recognizing danger situations (included triggers and roadblocks)                    ( )  Coping skills (new ways to manage stress, exercise, relaxation techniques, changing routine, distraction)                                                           ( )  Basic information about quitting (benefits of quitting, techniques in how to quit, available resources  ( ) Referral for counseling faxed to [facility]                                           ( ) Patient refused counseling  (x) Patient has not smoked in the last 30 days    Metabolic Screening:    Lab Results   Component Value Date    LABA1C 5.1 05/15/2017       No results for input(s): CHOL, TRIG, HDL, LDLCALC, LABVLDL in the last 72 hours. There is no height or weight on file to calculate BMI. BP Readings from Last 2 Encounters:   06/18/18 110/70   11/22/17 126/72           Pt admitted with followings belongings:  Dentures: None  Vision - Corrective Lenses: None  Hearing Aid: None  Jewelry: None  Body Piercings Removed: No  Clothing: Pants, Undergarments (Comment) (underwear)  Were All Patient Medications Collected?: Not Applicable  Other Valuables: None     Valuables sent home with: N/A Valuables placed in safe in security envelope, number: N/A. Patient's home medications were TO BE VERIFIED. Patient oriented to surroundings and program expectations and copy of patient rights given.  Received admission packet: N/A UNCOOPERATIVE WITH ADMISSION Consents reviewed, signed REFUSED Refused ALL CONSENTS, UNCOOPERATIVE WITH ADMIT Patient verbalize understanding:  DUSTY   Patient education on precautions: Paulette Tomlinson RN

## 2018-12-04 LAB
ABSOLUTE EOS #: 0 K/UL (ref 0–0.4)
ABSOLUTE IMMATURE GRANULOCYTE: NORMAL K/UL (ref 0–0.3)
ABSOLUTE LYMPH #: 2.9 K/UL (ref 1–4.8)
ABSOLUTE MONO #: 0.5 K/UL (ref 0.1–1.3)
BASOPHILS # BLD: 1 % (ref 0–2)
BASOPHILS ABSOLUTE: 0 K/UL (ref 0–0.2)
CHOLESTEROL/HDL RATIO: 6.6
CHOLESTEROL: 184 MG/DL
DIFFERENTIAL TYPE: NORMAL
EOSINOPHILS RELATIVE PERCENT: 0 % (ref 0–4)
HCT VFR BLD CALC: 39.9 % (ref 36–46)
HDLC SERPL-MCNC: 28 MG/DL
HEMOGLOBIN: 13.6 G/DL (ref 12–16)
IMMATURE GRANULOCYTES: NORMAL %
LDL CHOLESTEROL: 121 MG/DL (ref 0–130)
LYMPHOCYTES # BLD: 40 % (ref 24–44)
MCH RBC QN AUTO: 30.3 PG (ref 26–34)
MCHC RBC AUTO-ENTMCNC: 34.1 G/DL (ref 31–37)
MCV RBC AUTO: 88.8 FL (ref 80–100)
MONOCYTES # BLD: 7 % (ref 1–7)
NRBC AUTOMATED: NORMAL PER 100 WBC
PDW BLD-RTO: 12.5 % (ref 11.5–14.9)
PLATELET # BLD: 222 K/UL (ref 150–450)
PLATELET ESTIMATE: NORMAL
PMV BLD AUTO: 8.3 FL (ref 6–12)
RBC # BLD: 4.49 M/UL (ref 4–5.2)
RBC # BLD: NORMAL 10*6/UL
SEG NEUTROPHILS: 52 % (ref 36–66)
SEGMENTED NEUTROPHILS ABSOLUTE COUNT: 3.8 K/UL (ref 1.3–9.1)
TRIGL SERPL-MCNC: 173 MG/DL
VLDLC SERPL CALC-MCNC: ABNORMAL MG/DL (ref 1–30)
WBC # BLD: 7.3 K/UL (ref 3.5–11)
WBC # BLD: NORMAL 10*3/UL

## 2018-12-04 PROCEDURE — 85025 COMPLETE CBC W/AUTO DIFF WBC: CPT

## 2018-12-04 PROCEDURE — 99232 SBSQ HOSP IP/OBS MODERATE 35: CPT | Performed by: PSYCHIATRY & NEUROLOGY

## 2018-12-04 PROCEDURE — 6370000000 HC RX 637 (ALT 250 FOR IP): Performed by: PSYCHIATRY & NEUROLOGY

## 2018-12-04 PROCEDURE — 36415 COLL VENOUS BLD VENIPUNCTURE: CPT

## 2018-12-04 PROCEDURE — 80061 LIPID PANEL: CPT

## 2018-12-04 PROCEDURE — 1240000000 HC EMOTIONAL WELLNESS R&B

## 2018-12-04 RX ADMIN — OMEPRAZOLE 20 MG: 20 CAPSULE, DELAYED RELEASE ORAL at 08:55

## 2018-12-04 RX ADMIN — VORTIOXETINE 10 MG: 10 TABLET, FILM COATED ORAL at 08:53

## 2018-12-04 RX ADMIN — TRAZODONE HYDROCHLORIDE 50 MG: 50 TABLET ORAL at 21:09

## 2018-12-04 RX ADMIN — ARIPIPRAZOLE 5 MG: 5 TABLET ORAL at 08:53

## 2018-12-04 RX ADMIN — SIMVASTATIN 40 MG: 40 TABLET, FILM COATED ORAL at 21:09

## 2018-12-04 RX ADMIN — HYDROXYZINE HYDROCHLORIDE 25 MG: 25 TABLET, FILM COATED ORAL at 21:09

## 2018-12-04 NOTE — BH NOTE
One to one  Pt gets up and eats lunch with fair appetite, makes menu out with dietary and tries to call her mom, in which she left a message. Thought blocking and slow to respond at times. One to one monitoring maintained.

## 2018-12-04 NOTE — PLAN OF CARE
Problem: Altered Mood, Psychotic Behavior:  Goal: Able to verbalize reality based thinking  Able to verbalize reality based thinking   Outcome: Ongoing  Thoughts are a little clearer this morning. Continues to have some confusion and disorientation, but able to talk about her education, housing, work. Does have clearer conversation with the doctor than staff. Answers his questions quickly and clearly. When asked same questions from staff, she is more hesitant, says, \"I don't remember\" and makes comments about her mom being dead, Joce Frank must be dead. But I'm not sure, I've been here so long\". Reoriented that she has been here a day and a half, why she came in, where she is and the date. Encouraged to get up and eat in the day room and to attend groups. Relates feeling very tired today, and has been sleeping a lot this morning. Does take her meds and is cooperative with lab.

## 2018-12-04 NOTE — BH NOTE
1:1 note  Pt light sleeper & easily awaken by noise on unit. Restless & rests quietly at intervals. Pt remains safe & free from self harm behaviors.

## 2018-12-05 LAB
EKG ATRIAL RATE: 113 BPM
EKG P AXIS: 33 DEGREES
EKG P-R INTERVAL: 112 MS
EKG Q-T INTERVAL: 346 MS
EKG QRS DURATION: 72 MS
EKG QTC CALCULATION (BAZETT): 474 MS
EKG R AXIS: 51 DEGREES
EKG T AXIS: 34 DEGREES
EKG VENTRICULAR RATE: 113 BPM

## 2018-12-05 PROCEDURE — 93005 ELECTROCARDIOGRAM TRACING: CPT

## 2018-12-05 PROCEDURE — 1240000000 HC EMOTIONAL WELLNESS R&B

## 2018-12-05 PROCEDURE — 6370000000 HC RX 637 (ALT 250 FOR IP): Performed by: PSYCHIATRY & NEUROLOGY

## 2018-12-05 PROCEDURE — 99232 SBSQ HOSP IP/OBS MODERATE 35: CPT | Performed by: PSYCHIATRY & NEUROLOGY

## 2018-12-05 RX ADMIN — HYDROXYZINE HYDROCHLORIDE 25 MG: 25 TABLET, FILM COATED ORAL at 09:26

## 2018-12-05 RX ADMIN — OMEPRAZOLE 20 MG: 20 CAPSULE, DELAYED RELEASE ORAL at 07:40

## 2018-12-05 RX ADMIN — ACETAMINOPHEN 325 MG: 325 TABLET, FILM COATED ORAL at 07:40

## 2018-12-05 RX ADMIN — HYDROXYZINE HYDROCHLORIDE 25 MG: 25 TABLET, FILM COATED ORAL at 18:15

## 2018-12-05 RX ADMIN — ARIPIPRAZOLE 5 MG: 5 TABLET ORAL at 07:40

## 2018-12-05 RX ADMIN — VORTIOXETINE 10 MG: 10 TABLET, FILM COATED ORAL at 07:40

## 2018-12-05 ASSESSMENT — PAIN SCALES - GENERAL
PAINLEVEL_OUTOF10: 3
PAINLEVEL_OUTOF10: 0

## 2018-12-05 NOTE — BH NOTE
1: 1 NOTE     PT is resting in bed with eyes closed. No distress noted. 1:1 continued per doctors orders.

## 2018-12-05 NOTE — BH NOTE
1: 1 NOTE     PT got up and ate snack. PT used the phone. PT took PM medication. No distress noted. 1:1 continued per doctors orders.

## 2018-12-05 NOTE — PROGRESS NOTES
Pharmacy Med Education Group Note    Date: 12/05  Start Time: 1630  End Time: 1700    Number Participants in Group:  9    Goal:  Patient will demonstrate an understanding of the medications intended purpose and possible adverse effects  Topic: [place] for Pharmacy Med Ed Group    Discipline Responsible:     OT  AT  [facility].  RT     X Other       Participation Level:     None  Minimal      X Active Listener    X Interactive    Monopolizing         Participation Quality:    X Appropriate  Inappropriate     X       Attentive        Intrusive          Sharing        Resistant          Supportive        Lethargic       Affective:     X Congruent  Incongruent  Blunted  Flat    Constricted  Anxious  Elated  Angry    Labile  Depressed  Other         Cognitive:    X Alert  Oriented PPTP     Concentration   X G  F  P   Attention Span   X G  F  P   Short-Term Memory   X G  F  P   Long-Term Memory  G  F  P   ProblemSolving/  Decision Making  G  F  P   Ability to Process  Information   X G  F  P      Contributing Factors             Delusional             Hallucinating             Flight of Ideas             Other:       Modes of Intervention:    X Education   X Support  Exploration    Clarifying  Problem Solving  Confrontation    Socialization  Limit Setting  Reality Testing    Activity  Movement  Media    Other:            Response to Learning:    X Able to verbalize current knowledge/experience    Able to verbalize/acknowledge new learning    Able to retain information    Capable of insight    Able to change behavior    Progressing to goal    Other:        Comments:

## 2018-12-05 NOTE — PLAN OF CARE
Problem: Altered Mood, Psychotic Behavior:  Goal: Able to verbalize reality based thinking  Able to verbalize reality based thinking   Outcome: Ongoing  PT was able to have a reality based conversation. PT stated the only thing that is bothering her is anxiety. Goal: Ability to interact with others will improve  Ability to interact with others will improve   Outcome: Ongoing  PT and writer had a clear conversation. PT is able to talk to writer with no issues. PT is isolative to room and out for needs only.

## 2018-12-05 NOTE — BH NOTE
Department of Psychiatry  Attending Progress Note  Chief Complaint: Bipolar I disorder, most recent episode depressed ([address]     SUBJECTIVE:    I feel depressed and sad. I have no energy. OBJECTIVE    Physical  /72   Pulse 100   Temp 98 °F (36.7 °C) (Oral)   Resp 14   LMP  (LMP Unknown)      Mental Status Evaluation:  Patient says that she is feeling helpless, useless. Patient feels overwhelmed and sad. Patient feels that she has no motivation or energy. Patient feels suicidal and wants to end life by overdosing on medication. Patient that people are trying to harm her by poisoning her. Patient feels that people are talking about her. She has dysphoric mood and affect. She feels that her life is a waste and she should die. She is oriented to time place and person. Her memory to recent remote and immediate events are within normal limits. She has poor attention and concentration. Her insight and judgment are impaired.        Recent Results (from the past 72 hour(s))   Lipid panel - fasting    Collection Time: 12/04/18  7:27 AM   Result Value Ref Range    Cholesterol 184 <200 mg/dL    HDL 28 (L) >40 mg/dL    LDL Cholesterol 121 0 - 130 mg/dL    Chol/HDL Ratio 6.6 (H) <5    Triglycerides 173 (H) <150 mg/dL    VLDL NOT REPORTED 1 - 30 mg/dL   CBC auto differential    Collection Time: 12/04/18  7:27 AM   Result Value Ref Range    WBC 7.3 3.5 - 11.0 k/uL    RBC 4.49 4.0 - 5.2 m/uL    Hemoglobin 13.6 12.0 - 16.0 g/dL    Hematocrit 39.9 36 - 46 %    MCV 88.8 80 - 100 fL    MCH 30.3 26 - 34 pg    MCHC 34.1 31 - 37 g/dL    RDW 12.5 11.5 - 14.9 %    Platelets 929 047 - 687 k/uL    MPV 8.3 6.0 - 12.0 fL    NRBC Automated NOT REPORTED per 100 WBC    Differential Type NOT REPORTED     Seg Neutrophils 52 36 - 66 %    Lymphocytes 40 24 - 44 %    Monocytes 7 1 - 7 %    Eosinophils % 0 0 - 4 %    Basophils 1 0 - 2 %    Immature Granulocytes NOT REPORTED 0 %    Segs Absolute 3.80 1.3 - 9.1 k/uL Absolute Lymph # 2.90 1.0 - 4.8 k/uL    Absolute Mono # 0.50 0.1 - 1.3 k/uL    Absolute Eos # 0.00 0.0 - 0.4 k/uL    Basophils # 0.00 0.0 - 0.2 k/uL    Absolute Immature Granulocyte NOT REPORTED 0.00 - 0.30 k/uL    WBC Morphology NOT REPORTED     RBC Morphology NOT REPORTED     Platelet Estimate NOT REPORTED        Review of systems  Constitutional:  negative for chills, fevers, sweats  Respiratory:  negative for cough, dyspnea on exertion, hemoptysis, shortness of breath, wheezing  Cardiovascular:  negative for chest pain, chest pressure/discomfort, lower extremity edema, palpitations  Gastrointestinal:  negative for abdominal pain, constipation, diarrhea, nausea, vomiting  Neurological:  negative for dizziness, headache    Electronically signed by Starlett Hodgkin, MD on 12/5/2018 at 10:29 AM  Medications  Current Facility-Administered Medications   Medication Dose Route Frequency Provider Last Rate Last Dose    traZODone (DESYREL) tablet 50 mg  50 mg Oral Nightly PRN Carmen Woosd MD   50 mg at 12/04/18 2109    nicotine (NICODERM CQ) 14 MG/24HR 1 patch  1 patch Transdermal Daily Carmen Woods MD   1 patch at 12/03/18 1229    nicotine polacrilex (NICORETTE) gum 2 mg  2 mg Oral Q2H PRN Carmen Woods MD        haloperidol lactate (HALDOL) injection 5 mg  5 mg Intramuscular Q4H PRN Lori Gomez APRN - CNP   5 mg at 12/03/18 1632    LORazepam (ATIVAN) injection 1 mg  1 mg Intramuscular Q4H PRN ANAMIKA Olivas - CNP   1 mg at 12/03/18 1632    simvastatin (ZOCOR) tablet 40 mg  40 mg Oral Nightly Young KEN MD   40 mg at 12/04/18 2109    omeprazole (PRILOSEC) delayed release capsule 20 mg  20 mg Oral Daily Young KEN MD   20 mg at 12/05/18 0740    ARIPiprazole (ABILIFY) tablet 5 mg  5 mg Oral Daily Bernice KEN MD   5 mg at 12/05/18 0740    VORTIoxetine (TRINTELLIX) tablet 10 mg  10 mg Oral Daily Bernice KEN MD   10 mg at 12/05/18 0740    acetaminophen (TYLENOL) tablet 325 mg  325 mg Oral Q8H PRN Farhan Gaines MD   325 mg at 12/05/18 0740    hydrOXYzine (ATARAX) tablet 25 mg  25 mg Oral TID PRN Farhan Gaines MD   25 mg at 12/05/18 0926    magnesium hydroxide (MILK OF MAGNESIA) 400 MG/5ML suspension 30 mL  30 mL Oral Daily PRN Farhan Gaines MD             nicotine  1 patch Transdermal Daily    simvastatin  40 mg Oral Nightly    omeprazole  20 mg Oral Daily    ARIPiprazole  5 mg Oral Daily    VORTIoxetine  10 mg Oral Daily       ASSESSMENT  Bipolar I disorder, most recent episode depressed ([facility]     Patient's Response to Treatment: positive    PLAN  Dragon voice recognition software used in portions of this document. Plan: Continue current medication and management.

## 2018-12-06 PROCEDURE — 1240000000 HC EMOTIONAL WELLNESS R&B

## 2018-12-06 PROCEDURE — 6370000000 HC RX 637 (ALT 250 FOR IP): Performed by: PSYCHIATRY & NEUROLOGY

## 2018-12-06 PROCEDURE — 99232 SBSQ HOSP IP/OBS MODERATE 35: CPT | Performed by: PSYCHIATRY & NEUROLOGY

## 2018-12-06 RX ADMIN — VORTIOXETINE 10 MG: 10 TABLET, FILM COATED ORAL at 08:45

## 2018-12-06 RX ADMIN — HYDROXYZINE HYDROCHLORIDE 25 MG: 25 TABLET, FILM COATED ORAL at 12:43

## 2018-12-06 RX ADMIN — OMEPRAZOLE 20 MG: 20 CAPSULE, DELAYED RELEASE ORAL at 08:45

## 2018-12-06 RX ADMIN — SIMVASTATIN 40 MG: 40 TABLET, FILM COATED ORAL at 21:29

## 2018-12-06 RX ADMIN — ARIPIPRAZOLE 5 MG: 5 TABLET ORAL at 08:45

## 2018-12-06 NOTE — BH NOTE
Alyssa Rothman 1 NOTE:     Pt resting in bed with eyes closed. 1:1 maintained for safety and per order.

## 2018-12-06 NOTE — BH NOTE
Oral TID PRN Shana Smith MD   25 mg at 12/05/18 1815    magnesium hydroxide (MILK OF MAGNESIA) 400 MG/5ML suspension 30 mL  30 mL Oral Daily PRN Shana Smith MD             nicotine  1 patch Transdermal Daily    simvastatin  40 mg Oral Nightly    omeprazole  20 mg Oral Daily    ARIPiprazole  5 mg Oral Daily    VORTIoxetine  10 mg Oral Daily       ASSESSMENT  Bipolar I disorder, most recent episode depressed ([facility]     Patient's Response to Treatment: positive    PLAN  Dragon voice recognition software used in portions of this document. Plan: to continue current management.

## 2018-12-06 NOTE — BH NOTE
1: 1 NOTE>  Pt. Awake in dayroom. Pt. States she is feeling good and wants to go home. Spoke to her mother on the phone and she said will visit today and bring her clothes. Remains 1:1 for safety as ordered.

## 2018-12-06 NOTE — PLAN OF CARE
Problem: Anger Management/Homicidal Ideation:  Goal: Ability to verbalize frustrations and anger appropriately will improve  Ability to verbalize frustrations and anger appropriately will improve   Outcome: Ongoing  Pt. Verbalizing anger appropriately. Pt. Says she is upset she is here and doesn't remember what happened to get her here. Pt. Is calm and controlled. Medication compliant. Appropriate self care noted. Pt. Said she will shower after her mom visits and brings her clothes. Pt. Remains safe on the unit. Q 15 minute checks for safety maintained. Goal: Absence of angry outbursts  Absence of angry outbursts   Outcome: Ongoing  Pt. Remains free of any anger outbursts today. Pt. Remains safe on the unit. Q 15 minute checks for safety maintained. Problem: Altered Mood, Psychotic Behavior:  Goal: Able to verbalize reality based thinking  Able to verbalize reality based thinking   Outcome: Ongoing  Pt. Is reality based in conversation. Pt. Tells staff she knows she needs to stop smoking marijuana. Pt. Says she wants to return to work and keep herself busy. Pt. Remains safe on the unit. Q 15 minute checks for safety maintained. Goal: Ability to interact with others will improve  Ability to interact with others will improve   Outcome: Ongoing  Pt. Is quiet and guarded with peers, but appropriate when interacting with others. Says she can be shy and doesn't like to be around big groups of people. Pt. Remains safe on the unit. Q 15 minute checks for safety maintained.

## 2018-12-06 NOTE — PLAN OF CARE
Problem: Anger Management/Homicidal Ideation:  Goal: Absence of angry outbursts  Absence of angry outbursts   Outcome: Ongoing  Pt has not had any outburst at this time. Pt denies SI and A/H. Pt reported to this writer that she has some depression and anxiety. Pt has been calm, cooperative at this time. Pt reported she is ready for d/c. 1:1 maintained for safety and per order.

## 2018-12-06 NOTE — PLAN OF CARE
Problem: Altered Mood, Psychotic Behavior:  Goal: Ability to interact with others will improve  Ability to interact with others will improve   Outcome: Ongoing  Psychoeducation Group Note    Date: 12/6/2018  Start Time: 1330  End Time: 1415    Number Participants in Group:  12    Goal:  Patient will demonstrate increased interpersonal interaction.    Topic:  Social Skills / Communication Skills / Past - Present - Future    Discipline Responsible:   OT  AT  [facility]. X RT  Other       Participation Level:     None x Minimal   x Active Listener  Interactive    Monopolizing         Participation Quality:  x Appropriate  Inappropriate   x       Attentive        Intrusive   x       Sharing        Resistant          Supportive        Lethargic       Affective:    Congruent  Incongruent  Blunted x Flat    Constricted  Anxious  Elated  Angry    Labile  Depressed  Other  Bright       Cognitive:  x Alert x Oriented PPTP     Concentration x G  F  P   Attention Span x G  F  P   Short-Term Memory x G  F  P   Long-Term Memory  G  F  P   ProblemSolving/  Decision Making  G x F  P   Ability to Process  Information  G x F  P      Contributing Factors             Delusional             Hallucinating             Flight of Ideas             Other:       Modes of Intervention:  x Education x Support x Exploration   x Clarifying x Problem Solving x Confrontation   x Socialization x Limit Setting x Reality Testing   x Activity  Movement  Media    Other:            Response to Learning:  x Able to verbalize current knowledge/experience   x Able to verbalize/acknowledge new learning   x Able to retain information   x Capable of insight    Able to change behavior   x Progressing to goal    Other:        Comments:

## 2018-12-07 VITALS
OXYGEN SATURATION: 100 % | HEART RATE: 77 BPM | SYSTOLIC BLOOD PRESSURE: 123 MMHG | DIASTOLIC BLOOD PRESSURE: 67 MMHG | RESPIRATION RATE: 18 BRPM | TEMPERATURE: 97.9 F

## 2018-12-07 PROCEDURE — 99239 HOSP IP/OBS DSCHRG MGMT >30: CPT | Performed by: PSYCHIATRY & NEUROLOGY

## 2018-12-07 PROCEDURE — 5130000000 HC BRIDGE APPOINTMENT

## 2018-12-07 PROCEDURE — 6370000000 HC RX 637 (ALT 250 FOR IP): Performed by: PSYCHIATRY & NEUROLOGY

## 2018-12-07 RX ORDER — TRAZODONE HYDROCHLORIDE 50 MG/1
50 TABLET ORAL NIGHTLY PRN
Qty: 30 TABLET | Refills: 0 | Status: ON HOLD | OUTPATIENT
Start: 2018-12-07 | End: 2020-01-22

## 2018-12-07 RX ORDER — HYDROXYZINE HYDROCHLORIDE 25 MG/1
25 TABLET, FILM COATED ORAL 3 TIMES DAILY PRN
Qty: 90 TABLET | Refills: 0 | Status: SHIPPED | OUTPATIENT
Start: 2018-12-07 | End: 2018-12-17

## 2018-12-07 RX ORDER — ARIPIPRAZOLE 5 MG/1
5 TABLET ORAL DAILY
Qty: 30 TABLET | Refills: 0 | Status: ON HOLD | OUTPATIENT
Start: 2018-12-08 | End: 2020-01-22

## 2018-12-07 RX ADMIN — VORTIOXETINE 10 MG: 10 TABLET, FILM COATED ORAL at 08:21

## 2018-12-07 RX ADMIN — OMEPRAZOLE 20 MG: 20 CAPSULE, DELAYED RELEASE ORAL at 08:21

## 2018-12-07 RX ADMIN — ACETAMINOPHEN 325 MG: 325 TABLET, FILM COATED ORAL at 09:27

## 2018-12-07 RX ADMIN — HYDROXYZINE HYDROCHLORIDE 25 MG: 25 TABLET, FILM COATED ORAL at 12:56

## 2018-12-07 RX ADMIN — ARIPIPRAZOLE 5 MG: 5 TABLET ORAL at 08:21

## 2018-12-07 ASSESSMENT — PAIN SCALES - GENERAL
PAINLEVEL_OUTOF10: 0
PAINLEVEL_OUTOF10: 3

## 2018-12-07 NOTE — DISCHARGE INSTR - COC
Continuity of Care Form    Patient Name: Elpidio Valerio   :  1995  MRN:  [id number]    Admit date:  12/3/2018  Discharge date:  ***    Code Status Order: Full Code   Advance Directives:   [facility] Documentation     Date/Time Healthcare Directive Type of Healthcare Directive Copy in [facility] [address] Agent's Name Healthcare Agent's Phone Number    [phone]  No, patient does not have an advance directive for healthcare treatment -- -- -- -- --          Admitting Physician:  Young Wilson MD  PCP: Stephanie Frazier DO    Discharging Nurse: [facility] Unit/Room#: 0118/0118-01  Discharging Unit Phone Number: ***    Emergency Contact:   Extended Emergency Contact Information  Primary Emergency Contact: Ana Cosby  Address: [address], [address] Phone: [phone]  Mobile Phone: [phone]  Relation: Parent  Secondary Emergency Contact: Joanie Epps [address] Phone: [phone]  Mobile Phone: [phone]  Relation: Brother/Sister    Past Surgical History:  No past surgical history on file.     Immunization History:   Immunization History   Administered Date(s) Administered    DTP/HiB 1996    DTaP 1997, 1997, 1997, 2002, 2009    HPV Gardasil Quadrivalent 2009, 10/13/2009, 2012    Hepatitis A 2012, 2012    Hepatitis B, unspecified formulation 1996, 1996, 1997    Hib, unspecified formulation 1996, 1997, 1997, 1997    IPV (Ipol) 1996, 1997, 1997, 2002    Influenza Virus Vaccine 10/13/2009, 2012    MMR 10/07/1997, 2002    Meningococcal MCV4P (Menactra) 2009, 2012    Varicella (Varivax) 10/07/1997, 2012       Active Problems:  Patient Active Problem List   Diagnosis Code    Seizure disorder ([facility] G40.909   

## 2018-12-07 NOTE — PLAN OF CARE
Problem: Altered Mood, Psychotic Behavior:  Goal: Able to verbalize reality based thinking  Able to verbalize reality based thinking   Outcome: Ongoing  Psychoeducation Group Note    Date: 12/7/2018  Start Time: 0845  End Time: 0905    Number Participants in Group:  11    Goal:  Patient will demonstrate increased interpersonal interaction   Topic: Community meeting/ goals group    Discipline Responsible:   OT  AT  [facility]. x RT  Other       Participation Level:     None  Minimal   x Active Listener x Interactive    Monopolizing         Participation Quality:  x Appropriate  Inappropriate   x       Attentive        Intrusive   x       Sharing        Resistant          Supportive        Lethargic       Affective:   x Congruent  Incongruent  Blunted  Flat    Constricted  Anxious  Elated  Angry    Labile  Depressed  Other         Cognitive:  x Alert x Oriented PPTP     Concentration  G x F  P   Attention Span  G x F  P   Short-Term Memory  G x F  P   Long-Term Memory  G x F  P   ProblemSolving/  Decision Making  G x F  P   Ability to Process  Information  G x F  P      Contributing Factors             Delusional             Hallucinating             Flight of Ideas             Other:       Modes of Intervention:  x Education x Support x Exploration   x Clarifying x Problem Solving  Confrontation   x Socialization  Limit Setting x Reality Testing    Activity  Movement  Media    Other:            Response to Learning:  x Able to verbalize current knowledge/experience   x Able to verbalize/acknowledge new learning   x Able to retain information    Capable of insight    Able to change behavior   x Progressing to goal    Other:        Comments:

## 2018-12-07 NOTE — PLAN OF CARE
Problem: Anger Management/Homicidal Ideation:  Goal: Absence of angry outbursts  Absence of angry outbursts   Outcome: Ongoing  Pt was free of any angry outburst this evening. Pt came up to staff for needs. Pt took medications without issue. Pt states \"I am just feeling a little tired, other than that it was a good day. \"

## 2018-12-07 NOTE — PROGRESS NOTES
Psychoeducation Group Note    Date: 12/7/2018  Start Time: 1100  End Time: 4586    Number Participants in Group:  6    Goal:  Patient will demonstrate increased interpersonal interaction. Topic:  Boundaries / Triggers / Positive Thinking    Discipline Responsible:   OT  AT  [facility]. X RT  Other       Participation Level:     None  Minimal   x Active Listener x Interactive    Monopolizing         Participation Quality:  x Appropriate  Inappropriate   x       Attentive        Intrusive   x       Sharing        Resistant          Supportive        Lethargic       Affective:   x Congruent  Incongruent  Blunted  Flat    Constricted  Anxious  Elated  Angry    Labile  Depressed  Other  Bright       Cognitive:  x Alert x Oriented PPTP     Concentration x G  F  P   Attention Span x G  F  P   Short-Term Memory x G  F  P   Long-Term Memory  G  F  P   ProblemSolving/  Decision Making x G  F  P   Ability to Process  Information  G x F  P      Contributing Factors             Delusional             Hallucinating             Flight of Ideas             Other:       Modes of Intervention:  x Education x Support x Exploration   x Clarifying x Problem Solving x Confrontation   x Socialization x Limit Setting x Reality Testing   x Activity  Movement  Media    Other:            Response to Learning:  x Able to verbalize current knowledge/experience   x Able to verbalize/acknowledge new learning   x Able to retain information   x Capable of insight    Able to change behavior   x Progressing to goal    Other:        Comments: Patient attended and actively participated in group.

## 2018-12-07 NOTE — DISCHARGE SUMMARY
MG/ML injection Inject 150 mg into the muscle every 3 months      Fluticasone Propionate (FLONASE NA) by Nasal route as needed. STOP taking these medications       citalopram (CELEXA) 20 MG tablet Comments:   Reason for Stopping:         simvastatin (ZOCOR) 40 MG tablet Comments:   Reason for Stopping:              Social [facility] services. Discharge Exam:  Level of consciousness:  Within normal limits  Appearance: Street clothes, seated, with good grooming  Behavior/Motor: No abnormalities noted  Attitude toward examiner:  Cooperative, attentive, good eye contact  Speech:  spontaneous, normal rate, normal volume and well articulated  Mood:  euthymic  Affect:  mood congruent  Thought processes:  linear, goal directed and coherent  Thought content:  Homocidal ideation denies  Suicidal Ideation:  denies suicidal ideation  Delusions:  no evidence of delusions  Perceptual Disturbance:  denies any perceptual disturbance  Cognition:  In tact  Memory: age appropriate  Insight & Judgement: fair  Medication side effects:  denies     Disposition: home    Patient Instructions: Activity: activity as tolerated    Follow-up as scheduled with [address] services. Time Spent: 35 minutes    Engagement: Patient displayed a good level of engagement with the treatments offered during this admission. Discharge planning, findings, and recommendations were discussed with    Signed:  Jodie Conti   12/7/2018  11:17 AM  Dragon voice recognition software used in portions of this document.

## 2019-06-21 ENCOUNTER — OFFICE VISIT (OUTPATIENT)
Dept: PODIATRY | Age: 24
End: 2019-06-21
Payer: COMMERCIAL

## 2019-06-21 VITALS
HEART RATE: 109 BPM | SYSTOLIC BLOOD PRESSURE: 112 MMHG | HEIGHT: 60 IN | DIASTOLIC BLOOD PRESSURE: 70 MMHG | BODY MASS INDEX: 41.62 KG/M2 | WEIGHT: 212 LBS

## 2019-06-21 DIAGNOSIS — L30.9 DERMATITIS OF FOOT: Primary | ICD-10-CM

## 2019-06-21 PROCEDURE — 99213 OFFICE O/P EST LOW 20 MIN: CPT | Performed by: PODIATRIST

## 2019-06-21 NOTE — PROGRESS NOTES
Subjective:  Ondina Fofana is a 26 y.o. female who presents to the office today complaining of itching and blisters on feet. Symptoms began week(s) ago. Patient relates pain is Present. Pain is rated 1 out of 10 and is described as mild. Treatments prior to today's visit include: none. Currently denies F/C/N/V. Allergies   Allergen Reactions    Adderall [Amphetamine-Dextroamphetamine] Other (See Comments)     Tremors.  Concerta [Methylphenidate] Other (See Comments)     Was not effective.  Strattera [Atomoxetine] Other (See Comments)     Weight gain and sleepiness. Past Medical History:   Diagnosis Date    ADHD (attention deficit hyperactivity disorder)     (with learning delays)    Allergic rhinitis     Dysmetabolic syndrome X     (Dr. Tracy Amin)    Dysthymic disorder     Hyperlipidemia     Insulin resistance     Juvenile seizure disorder ([facility]     none actively    Keratosis pilaris     Mental retardation     mild    Simón syndrome     Other specified acquired hypothyroidism     (Concha Burnham)   Matthew Og Other specified congenital anomalies     (Concha Burnham)     Reflux     Scoliosis     (mild)    Seizures ([facility] [facility]     fever induced 2000    Vitamin D deficiency        Prior to Admission medications    Medication Sig Start Date End Date Taking?  Authorizing Provider   triamcinolone (KENALOG) 0.1 % ointment Apply topically 2 times daily for 7 days 6/21/19 6/28/19 Yes Shannan Lyons DPM   traZODone (DESYREL) 50 MG tablet Take 1 tablet by mouth nightly as needed for Sleep 12/7/18  Yes Young KEN MD   VORTIoxetine (TRINTELLIX) 10 MG TABS tablet Take 10 mg by mouth daily 12/8/18  Yes Young KEN MD   ARIPiprazole (ABILIFY) 5 MG tablet Take 1 tablet by mouth daily 12/8/18  Yes Shelley KEN MD   naproxen (NAPROSYN) 500 MG tablet Take 500 mg by mouth as needed    Yes Historical Provider, MD   furosemide (LASIX) 20 MG tablet TAKE 1 TABLET BY MOUTH EVERY DAY AS NEEDED FOR FLUID RETENTION 18  Yes Historical Provider, MD   omeprazole (PRILOSEC) 20 MG delayed release capsule TAKE ONE CAPSULE BY MOUTH EVERY DAY 17  Yes ANAMIKA Linda CNP   vitamin D (ERGOCALCIFEROL) 95208 UNITS CAPS capsule Take 1 capsule by mouth once a week 17  Yes ANAMIKA Linda CNP   atorvastatin (LIPITOR) 40 MG tablet Take 1 tablet by mouth daily 5/15/17  Yes Gio Mckay MD   medroxyPROGESTERone (DEPO-PROVERA) 150 MG/ML injection Inject 150 mg into the muscle every 3 months   Yes Historical Provider, MD   Fluticasone Propionate (FLONASE NA) by Nasal route as needed. Yes Historical Provider, MD       History reviewed. No pertinent surgical history. Family History   Problem Relation Age of Onset    Stroke Father 45         of complications - brainstem stroke.  Diabetes Father 40        (type 2)    Elevated Lipids Father     Hypertension Father     Coronary Art Dis Father 40    Thyroid Disease Father         hypothyroidism.  Other Father         (posstible Simón syndrome).  Elevated Lipids Mother     Diabetes Mother         type 2.    Osteoarthritis Mother     Heart Disease Other     Other Other         mental retardation and developmental delay on both maternal & paternal sides.  Asthma Brother     Thyroid Disease Brother         hypothyroidism    Other Brother         insulin resistance    Other Brother         acid reflux    Allergic Rhinitis Brother     Glaucoma Neg Hx        Social History     Tobacco Use    Smoking status: Current Every Day Smoker     Packs/day: 0.25     Years: 2.00     Pack years: 0.50     Types: Cigarettes    Smokeless tobacco: Former User     Types: Chew   Substance Use Topics    Alcohol use: Yes     Alcohol/week: 0.0 oz     Comment: social       Review of Systems: All 12 systems reviewed and pertinent positives noted above.     Lower Extremity Physical Examination:     Vitals:   Vitals:    06/21/19 0851   BP: 112/70   Pulse: 109     General: AAO x 3 in NAD. Vascular: DP and PT pulses palpable 2/4, bilateral.  CFT <3 seconds, bilateral.  Hair growth present to the level of the digits, bilateral.  Edema absent, bilateral.  Varicosities absent, bilateral. Erythema absent, bilateral. Distal Rubor absent bilateral.  Temperature within normal limits bilateral. Hyperpigmentation absent bilateral. No atrophic skin. Neurological: Sensation intact to light touch to level of digits, bilateral.  Protective sensation intact 10/10 sites via 5.07/10g Dumont-Carli Monofilament, bilateral.  negative Tinel's, bilateral.  negative Valleix sign, bilateral.  Vibratory intact bilateral.  Reflexes Decreased bilateral.  Paresthesias negative. Dysthesias negative. Sharp/dull intact bilateral.    Musculoskeletal: Muscle strength 5/5, bilateral.  Pain absent upon palpation bilateral. Normal medial longitudinal arch, bilateral.  Ankle ROM decreased ,bilateral.  1st MPJ ROM within normal limits, bilateral.  Dorsally contracted digits absent. No other foot deformities. Integument: Warm, dry, supple, bilateral. Erythematous patch dorsal L forefoot. No plaque or erosion noted. No scaly skin, no active blistering or proximal streaking. Open lesion absent, bilateral.  Interdigital maceration absent to web spaces bilateral.  Nails within normal limits. Fissures absent, bilateral. Hyperkeratotic tissue is absent. Asessment: Patient is a 26 y.o. female with:    Diagnosis Orders   1. Dermatitis of foot         Plan: Patient examined and evaluated. Current condition and treatment options discussed in detail.     Pt educated on ways to decrease moisture in feet especially while working  Orders Placed This Encounter   Medications    triamcinolone (KENALOG) 0.1 % ointment     Sig: Apply topically 2 times daily for 7 days     Dispense:  1 Tube     Refill:  1   other tx options discussed  Contact office with any questions/problems/concerns. RTC in 2 week(s).

## 2020-01-21 ENCOUNTER — HOSPITAL ENCOUNTER (INPATIENT)
Age: 25
LOS: 8 days | Discharge: HOME | DRG: 753 | End: 2020-01-29
Attending: PSYCHIATRY & NEUROLOGY | Admitting: PSYCHIATRY & NEUROLOGY
Payer: MEDICAID

## 2020-01-21 PROBLEM — F23 PSYCHOTIC EPISODE (HCC): Status: ACTIVE | Noted: 2020-01-21

## 2020-01-21 PROCEDURE — 6370000000 HC RX 637 (ALT 250 FOR IP): Performed by: PSYCHIATRY & NEUROLOGY

## 2020-01-21 PROCEDURE — 1240000000 HC EMOTIONAL WELLNESS R&B

## 2020-01-21 RX ORDER — TRAZODONE HYDROCHLORIDE 50 MG/1
50 TABLET ORAL NIGHTLY PRN
Status: DISCONTINUED | OUTPATIENT
Start: 2020-01-22 | End: 2020-01-24

## 2020-01-21 RX ADMIN — NICOTINE POLACRILEX 2 MG: 2 GUM, CHEWING BUCCAL at 23:53

## 2020-01-21 ASSESSMENT — PAIN DESCRIPTION - FREQUENCY: FREQUENCY: INTERMITTENT

## 2020-01-21 ASSESSMENT — SLEEP AND FATIGUE QUESTIONNAIRES
DO YOU USE A SLEEP AID: NO
RESTFUL SLEEP: NO
SLEEP PATTERN: DIFFICULTY FALLING ASLEEP;DISTURBED/INTERRUPTED SLEEP;EARLY AWAKENING;RESTLESSNESS;INSOMNIA
DIFFICULTY ARISING: NO
DIFFICULTY STAYING ASLEEP: YES
AVERAGE NUMBER OF SLEEP HOURS: 4
DIFFICULTY FALLING ASLEEP: YES
DO YOU HAVE DIFFICULTY SLEEPING: YES

## 2020-01-21 ASSESSMENT — PAIN SCALES - GENERAL: PAINLEVEL_OUTOF10: 5

## 2020-01-21 ASSESSMENT — LIFESTYLE VARIABLES: HISTORY_ALCOHOL_USE: YES

## 2020-01-21 ASSESSMENT — PAIN DESCRIPTION - LOCATION: LOCATION: NECK

## 2020-01-21 ASSESSMENT — PATIENT HEALTH QUESTIONNAIRE - PHQ9: SUM OF ALL RESPONSES TO PHQ QUESTIONS 1-9: 18

## 2020-01-22 PROBLEM — F60.3 BORDERLINE PERSONALITY DISORDER (HCC): Chronic | Status: ACTIVE | Noted: 2020-01-22

## 2020-01-22 PROCEDURE — 90792 PSYCH DIAG EVAL W/MED SRVCS: CPT | Performed by: NURSE PRACTITIONER

## 2020-01-22 PROCEDURE — 1240000000 HC EMOTIONAL WELLNESS R&B

## 2020-01-22 PROCEDURE — 6370000000 HC RX 637 (ALT 250 FOR IP): Performed by: NURSE PRACTITIONER

## 2020-01-22 PROCEDURE — 99253 IP/OBS CNSLTJ NEW/EST LOW 45: CPT | Performed by: PHYSICIAN ASSISTANT

## 2020-01-22 PROCEDURE — 6370000000 HC RX 637 (ALT 250 FOR IP): Performed by: PSYCHIATRY & NEUROLOGY

## 2020-01-22 RX ORDER — IBUPROFEN 800 MG/1
800 TABLET ORAL 3 TIMES DAILY PRN
Status: DISCONTINUED | OUTPATIENT
Start: 2020-01-22 | End: 2020-01-29 | Stop reason: HOSPADM

## 2020-01-22 RX ORDER — NICOTINE 21 MG/24HR
1 PATCH, TRANSDERMAL 24 HOURS TRANSDERMAL DAILY
Status: DISCONTINUED | OUTPATIENT
Start: 2020-01-22 | End: 2020-01-29 | Stop reason: HOSPADM

## 2020-01-22 RX ORDER — ACETAMINOPHEN 325 MG/1
650 TABLET ORAL EVERY 4 HOURS PRN
Status: DISCONTINUED | OUTPATIENT
Start: 2020-01-22 | End: 2020-01-29 | Stop reason: HOSPADM

## 2020-01-22 RX ORDER — QUETIAPINE FUMARATE 100 MG/1
100 TABLET, FILM COATED ORAL NIGHTLY
Status: DISCONTINUED | OUTPATIENT
Start: 2020-01-22 | End: 2020-01-27

## 2020-01-22 RX ORDER — QUETIAPINE FUMARATE 50 MG/1
50 TABLET, FILM COATED ORAL DAILY
Status: DISCONTINUED | OUTPATIENT
Start: 2020-01-22 | End: 2020-01-29 | Stop reason: HOSPADM

## 2020-01-22 RX ORDER — HYDROXYZINE HYDROCHLORIDE 25 MG/1
25 TABLET, FILM COATED ORAL 3 TIMES DAILY PRN
Status: DISCONTINUED | OUTPATIENT
Start: 2020-01-22 | End: 2020-01-29 | Stop reason: HOSPADM

## 2020-01-22 RX ORDER — BENZTROPINE MESYLATE 1 MG/ML
2 INJECTION INTRAMUSCULAR; INTRAVENOUS 2 TIMES DAILY PRN
Status: DISCONTINUED | OUTPATIENT
Start: 2020-01-22 | End: 2020-01-29 | Stop reason: HOSPADM

## 2020-01-22 RX ORDER — MAGNESIUM HYDROXIDE/ALUMINUM HYDROXICE/SIMETHICONE 120; 1200; 1200 MG/30ML; MG/30ML; MG/30ML
30 SUSPENSION ORAL EVERY 6 HOURS PRN
Status: DISCONTINUED | OUTPATIENT
Start: 2020-01-22 | End: 2020-01-29 | Stop reason: HOSPADM

## 2020-01-22 RX ADMIN — ACETAMINOPHEN 650 MG: 325 TABLET, FILM COATED ORAL at 11:56

## 2020-01-22 RX ADMIN — QUETIAPINE FUMARATE 100 MG: 100 TABLET ORAL at 21:30

## 2020-01-22 RX ADMIN — HYDROXYZINE HYDROCHLORIDE 25 MG: 25 TABLET, FILM COATED ORAL at 21:30

## 2020-01-22 RX ADMIN — HYDROXYZINE HYDROCHLORIDE 25 MG: 25 TABLET, FILM COATED ORAL at 11:56

## 2020-01-22 RX ADMIN — QUETIAPINE FUMARATE 50 MG: 50 TABLET ORAL at 14:31

## 2020-01-22 RX ADMIN — TRAZODONE HYDROCHLORIDE 50 MG: 50 TABLET ORAL at 21:30

## 2020-01-22 ASSESSMENT — PAIN DESCRIPTION - LOCATION: LOCATION: BACK

## 2020-01-22 ASSESSMENT — PAIN DESCRIPTION - PAIN TYPE: TYPE: CHRONIC PAIN

## 2020-01-22 ASSESSMENT — PAIN SCALES - GENERAL
PAINLEVEL_OUTOF10: 1
PAINLEVEL_OUTOF10: 3

## 2020-01-22 ASSESSMENT — LIFESTYLE VARIABLES: HISTORY_ALCOHOL_USE: YES

## 2020-01-22 NOTE — H&P
HISTORY and [id number]       NAME:  Azul Alegria  MRN: [id number]   YOB: 1995   Date: 2020   Age: 22 y.o. Gender: female     COMPLAINT AND PRESENT HISTORY:      Azul Alegria is 22 y.o.,  female, admitted because of Psychotic Episode. Patient mentioned that she wanted to meet Adama Beltran, patient was reportedly agitated and coherent with flight of ideas prior to admission found by police. Patient admits to having suicidal ideations no specific plan in mind. Patient is very delusional denies any homicidal ideations denies any previous suicide attempts. Patient has poor sleep appetite and concentration. Despite her symptoms patient denies any current auditory visual hallucinations. Patient claims to be compliant with her medication but states that she is been taking them on and off since they make her drowsy. Patient lives alone admits to using marijuana denies any alcohol abuse. DIAGNOSTIC RESULTS   Labs:    PAST MEDICAL HISTORY     Past Medical History:   Diagnosis Date    ADHD (attention deficit hyperactivity disorder)     (with learning delays)    Allergic rhinitis     Dysmetabolic syndrome X     (Dr. Christian Dandy)    Dysthymic disorder     Hyperlipidemia     Insulin resistance     Juvenile seizure disorder ([facility] [address]     none actively    Keratosis pilaris     Mental retardation     mild    McCune syndrome     Other specified acquired hypothyroidism     ([facility]er, [facility])    Other specified congenital anomalies     (Dr. Donna Shaffer, [facility])     Reflux     Scoliosis     (mild)    Seizures ([facility])     fever induced 2000    Vitamin D deficiency          SURGICAL HISTORY     History reviewed. No pertinent surgical history. FAMILY HISTORY       Family History   Problem Relation Age of Onset    Stroke Father 45         of complications - brainstem stroke.     Diabetes Father 40        (type 2)    Elevated Lipids Intimate partner violence:     Fear of current or ex partner: None     Emotionally abused: None     Physically abused: None     Forced sexual activity: None   Other Topics Concern    None   Social History Narrative    ** Merged History Encounter **                REVIEW OF SYSTEMS      Allergies   Allergen Reactions    Adderall [Amphetamine-Dextroamphetamine] Other (See Comments)     Tremors.  Concerta [Methylphenidate] Other (See Comments)     Was not effective.  Strattera [Atomoxetine] Other (See Comments)     Weight gain and sleepiness. No current facility-administered medications on file prior to encounter. No current outpatient medications on file prior to encounter. General health:  Fairly good. No fever or chills. Skin:  No itching, redness or rash. Head, eyes, ears, nose, throat:  No headache, epistaxis, rhinorrhea hearing loss or sore throat. Neck:  No pain, stiffness or masses. Cardiovascular/Respiratory system:  No chest pain, palpitation, shortness of breath, coughing or expectoration. Gastrointestinal tract: No abdominal pain, nausea, vomiting, dysphagia, diarrhea or constipation. Genitourinary:  No burning on micturition. No hesitancy, urgency, frequency or discoloration of urine. Locomotor:  No bone or joint pains. No swelling or deformities. Neuropsychiatric:  See HPI. GENERAL PHYSICAL EXAM:     Vitals: /80   Pulse 108   Temp 98.8 °F (37.1 °C) (Oral)   Resp 14   Ht 5' 1\" (1.549 m)   Wt 212 lb (96.2 kg)   LMP  (Within Months)   SpO2 97%   Breastfeeding No   BMI 40.06 kg/m²  Body mass index is 40.06 kg/m². Pt was examined with a nurse present in the room. GENERAL APPEARANCE:  Melany Camacho is 22 y.o.,  female, moderately obese, nourished, conscious, alert. Does not appear to be distress or pain at this time.           SKIN:  Warm, dry, no cyanosis or

## 2020-01-22 NOTE — BH NOTE
Patient lives in an apartment by herself in [place]. Her mother, step father, brother and sister live in the area and are support to her. She graduated from high school and is working in a [address] Social History     Socioeconomic History    Marital status: Single     Spouse name: Not on file    Number of children: Not on file    Years of education: Not on file    Highest education level: Not on file   Occupational History    Occupation:      Employer: [employer] Financial resource strain: Not on file    Food insecurity:     Worry: Not on file     Inability: Not on file    Transportation needs:     Medical: Not on file     Non-medical: Not on file   Tobacco Use    Smoking status: Current Every Day Smoker     Packs/day: 0.25     Years: 2.00     Pack years: 0.50     Types: Cigarettes    Smokeless tobacco: Former User     Types: Chew    Tobacco comment: Patient refused counseling. Substance and Sexual Activity    Alcohol use:  Yes     Alcohol/week: 0.0 standard drinks     Comment: social    Drug use: No     Comment: hasnt smoked pot in 2 weeks    Sexual activity: Yes     Partners: Male     Birth control/protection: Injection     Comment: Partner x 6 months   Lifestyle    Physical activity:     Days per week: Not on file     Minutes per session: Not on file    Stress: Not on file   Relationships    Social connections:     Talks on phone: Not on file     Gets together: Not on file     Attends Taoism service: Not on file     Active member of club or organization: Not on file     Attends meetings of clubs or organizations: Not on file     Relationship status: Not on file    Intimate partner violence:     Fear of current or ex partner: Not on file     Emotionally abused: Not on file     Physically abused: Not on file     Forced sexual activity: Not on file   Other Topics Concern    Not on file   Social History Narrative    ** Merged History Encounter **

## 2020-01-22 NOTE — GROUP NOTE
Group Therapy Note    Date: 1/22/2020    Group Start Time: 1100  Group End Time: 1130  Group Topic: Psychoeducation    [facility]    NICCI OrtizS    Patient's Goal:  To demonstrate increased interpersonal interaction     Notes:  Pt attended and participated in group. Status After Intervention:  Improved    Participation Level: Active Listener and Interactive    Participation Quality: Appropriate and Attentive      Speech:  normal      Thought Process/Content:  Thought Blocking      Affective Functioning: Blunted      Mood: dysphoric      Level of consciousness:  Alert       Response to Learning: Progressing to goal      Endings: None Reported    Modes of Intervention: Socialization, Exploration, Problem-solving and Reality-testing      Discipline Responsible: Psychoeducational Specialist      Signature:  Nancy Lal

## 2020-01-22 NOTE — CARE COORDINATION
she lives on her own and receives SSDI. Patient mother reports to ED staff that patient has a history of alcohol, marijuana, and meth use. Patient states that she is currently not having AH, VH, SI and HI.

## 2020-01-22 NOTE — PLAN OF CARE
[facility]  Initial Interdisciplinary Treatment Plan NO      Original treatment plan Date & Time: 1/22/20 0930    Admission Type:  Admission Type: Involuntary    Reason for admission:   Reason for Admission: Increased agitation, not sleeping for several days, found by police.      Estimated Length of Stay:  5-7days  Estimated Discharge Date: to be determined by physician    PATIENT STRENGTHS:  Patient Strengths:Strengths: Positive Support, Social Skills  Patient Strengths and Limitations:Limitations: Difficulty problem solving/relies on others to help solve problems, Inappropriate/potentially harmful leisure interests  Addictive Behavior: Addictive Behavior  In the past 3 months, have you felt or has someone told you that you have a problem with:  : None  Do you have a history of Chemical Use?: No  Do you have a history of Alcohol Use?: Yes  Do you have a history of Street Drug Abuse?: Yes  Histroy of Prescripton Drug Abuse?: No  Medical Problems:  Past Medical History:   Diagnosis Date    ADHD (attention deficit hyperactivity disorder)     (with learning delays)    Allergic rhinitis     Dysmetabolic syndrome X     (Dr. Christian Dandy)    Dysthymic disorder     Hyperlipidemia     Insulin resistance     Juvenile seizure disorder ([facility] [address]     none actively    Keratosis pilaris     Mental retardation     mild    Ashton syndrome     Other specified acquired hypothyroidism     (Dr. Donna Shaffer, [facility])    Other specified congenital anomalies     (Dr. Donna Shaffer, [facility])     Reflux     Scoliosis     (mild)    Seizures ([facility] [address]     fever induced 2000    Vitamin D deficiency      Status EXAM:Status and Exam  Normal: No  Facial Expression: Avoids Gaze, Flat, Expressionless, Sad  Affect: Appropriate  Level of Consciousness: Alert  Mood:Normal: No  Mood: Depressed, Sad, Helpless  Motor Activity:Normal: No  Motor Activity: Decreased  Interview Behavior: Cooperative  Preception: [place] to Person, [place] to Time, [place] to Place, [place] to Situation  Attention:Normal: No  Attention: Distractible, Unable to Concentrate  Thought Processes: (Logical)  Thought Content:Normal: No  Thought Content: Preoccupations  Hallucinations: None  Delusions: No  Memory:Normal: No  Memory: Poor Recent, Poor Remote  Insight and Judgment: No  Insight and Judgment: Unmotivated, Poor Insight, Poor Judgment  Present Suicidal Ideation: No  Present Homicidal Ideation: No    EDUCATION:   Learner Progress Toward Treatment Goals: reviewed group plans and strategies for care    Method:group therapy, medication compliance, individualized assessments and care planning    Outcome: needs reinforcement    PATIENT GOALS: to be discussed with patient within 72 hours    PLAN/TREATMENT RECOMMENDATIONS:     continue group therapy , medications compliance, goal setting, individualized assessments and care, continue to monitor pt on unit      SHORT-TERM GOALS:   Time frame for Short-Term Goals: 5-7 days    LONG-TERM GOALS:  Time frame for Long-Term Goals: 6 months  Members Present in Team Meeting: See Signature Sheet    Janie [address]

## 2020-01-22 NOTE — BH NOTE
`[facility] [place]  Admission Note     Admission Type:   Admission Type: Involuntary    Reason for admission:  Reason for Admission: Increased agitation, not sleeping for several days, found by police.      PATIENT STRENGTHS:  Strengths: Positive Support, Social Skills    Patient Strengths and Limitations:  Limitations: Difficulty problem solving/relies on others to help solve problems, Inappropriate/potentially harmful leisure interests    Addictive Behavior:   Addictive Behavior  In the past 3 months, have you felt or has someone told you that you have a problem with:  : None  Do you have a history of Chemical Use?: No  Do you have a history of Alcohol Use?: Yes  Do you have a history of Street Drug Abuse?: Yes  Histroy of Prescripton Drug Abuse?: No    Medical Problems:   Past Medical History:   Diagnosis Date    ADHD (attention deficit hyperactivity disorder)     (with learning delays)    Allergic rhinitis     Dysmetabolic syndrome X     (Dr. Yaima Hart)    Dysthymic disorder     Hyperlipidemia     Insulin resistance     [facility]     none actively    Keratosis pilaris     Mental retardation     mild    [place] syndrome     Other specified acquired hypothyroidism     ([facility]. Alin Doherty, [facility])    Other specified congenital anomalies     (Dr. Alin Doherty, [facility])     Reflux     Scoliosis     (mild)    Seizures ([facility]     fever induced 2000    Vitamin D deficiency        Status EXAM:  Status and Exam  Normal: No  Facial Expression: Avoids Gaze, Flat, Sad  Affect: Appropriate  Level of Consciousness: Alert  Mood:Normal: No  Mood: Depressed, Anxious, Sad  Motor Activity:Normal: No  Motor Activity: Decreased  Interview Behavior: Cooperative  Preception: Means to Person, Means to Time, Means to Place, Means to Situation  Attention:Normal: No  Attention: Unable to Concentrate  Thought Processes: (Logical)  Thought Content:Normal: No  Thought Content: Applicable  Other Valuables: Other (Comment)(necklace)     Valuables sent home with N/A. Valuables placed in safe in security envelope, number:  [id number]. Patient's home medications were reviewed. Patient oriented to surroundings and program expectations and copy of patient rights given. Received admission packet:  Yes. Consents reviewed, signed No. Refused Yes. Patient verbalize understanding:  Yes. Patient education on precautions: Yes    Patient admitted to room 211 per provider order, upon arrival patient was scanned with metal detector, changed into hospital attire, and offered nourishment. Patient is pinked and not signed in. Upon arrive patient denies current suicidal/homicidal ideations and Hallucinations, patient rated depression a 2 out of 10, patient denied anxiety but became very tearful and states she does not want to be here. Patient reports having poor sleep patterns and not getting enough sleep. Patient denies nutrition issues. Patient use to be on Abilify, not currently taking any home medication.      Hortencia Gusman RN  Electronically signed by Hortencia Gusman RN on 1/22/2020 at 12:14 AM

## 2020-01-23 PROCEDURE — 6370000000 HC RX 637 (ALT 250 FOR IP): Performed by: NURSE PRACTITIONER

## 2020-01-23 PROCEDURE — 6370000000 HC RX 637 (ALT 250 FOR IP): Performed by: PSYCHIATRY & NEUROLOGY

## 2020-01-23 PROCEDURE — 1240000000 HC EMOTIONAL WELLNESS R&B

## 2020-01-23 RX ADMIN — TRAZODONE HYDROCHLORIDE 50 MG: 50 TABLET ORAL at 21:41

## 2020-01-23 RX ADMIN — QUETIAPINE FUMARATE 100 MG: 100 TABLET ORAL at 21:42

## 2020-01-23 RX ADMIN — QUETIAPINE FUMARATE 50 MG: 50 TABLET ORAL at 09:39

## 2020-01-23 RX ADMIN — HYDROXYZINE HYDROCHLORIDE 25 MG: 25 TABLET, FILM COATED ORAL at 21:41

## 2020-01-23 NOTE — GROUP NOTE
Group Therapy Note    Date: 1/23/2020    Group Start Time: 1430  Group End Time: 3534  Group Topic: Cognitive Skills    JEREMY Santos, CTRS        Group Therapy Note    Attendees: 9/16         Pt did not participate in Cognitive Skills Group at 1430 when encouraged by RT due to resting in room. Pt was offered talk time as an alternative to group but declined.          Discipline Responsible: Psychoeducational Specialist        Signature:  Tejinder Nixon

## 2020-01-23 NOTE — PLAN OF CARE
Problem: Altered Mood, Depressive Behavior:  Goal: Able to verbalize and/or display a decrease in depressive symptoms  Description  Able to verbalize and/or display a decrease in depressive symptoms  1/23/2020 1131 by Stephanie Glass LPN  Outcome: Ongoing     Problem: Altered Mood, Depressive Behavior:  Goal: Ability to disclose and discuss suicidal ideas will improve  Description  Ability to disclose and discuss suicidal ideas will improve  1/23/2020 1131 by Stephanie Glass LPN  Outcome: Ongoing     Patient is alert and oriented. Reports sleeping well and appetite is good. Patient is isolative to self and room, out for needs and meals. Denies suicidal and homicidal ideations. Denies depression and anxiety. Patient has a flat affect. Free from self harm behaviors, 15 minute visual checks maintained for safety. Medication compliant.

## 2020-01-23 NOTE — GROUP NOTE
Group Therapy Note    Date: 1/23/2020    Group Start Time: 1000  Group End Time: 7504  Group Topic: Cognitive Skills    [facility] LETTY Galvan, CTRS        Group Therapy Note    Attendees: 5/7         Patient's Goal:  To increase social interaction and to practice decision making and communication. Notes: Pt participated fully in group task . Pt was able to practice decision making and communication skills independently and was given prompts r/t concentration when needed. Status After Intervention:  Improved    Participation Level: Active Listener and Interactive     Participation Quality: Appropriate, Attentive, Sharing         Speech:  normal        Thought Process/Content: Logical but needs prompts to return to task at times due to preoccupied but also due to questions about getting clothing from hospital -which RT had discussed solutions with pt prior to group and stated steps could be taken after group. Affective Functioning: Blunted        Mood: dysthymic, pt lists things she does not like rather than focusing on task at times e.g \"don't like hospitals\". Pt appears to seek attention at times with negative statements. RT acknowledges pt dislikes/feelings without correction and identifies a positive focus for today and return to group task.         Level of consciousness:  Alert, Oriented x4 and Attentive        Response to Learning: Able to verbalize current knowledge/experience, Able to verbalize/acknowledge new learning, Able to retain information and Progressing to goal        Endings: None Reported     Modes of Intervention: Education, Support, Socialization, Exploration, Clarifying and Problem-solving        Discipline Responsible: Psychoeducational Specialist        Signature:  Litzy Kauffman

## 2020-01-23 NOTE — GROUP NOTE
Group Therapy Note    Date: 1/23/2020    Group Start Time: 1100  Group End Time: 7019  Group Topic: Group Therapy    JEREMY NARVAEZ    CHAGO Khan LSW        Group Therapy Note    Attendees: 5/7         Patient's Goal:  Increase interpersonal relationship skills    Notes:  Patient was an active participant in group, patient referred to herself in third person throughout group discussion     Status After Intervention:  Unchanged    Participation Level:  Active Listener and Interactive    Participation Quality: Appropriate, Attentive, Sharing and Supportive      Speech:  normal      Thought Process/Content: Logical      Affective Functioning: Congruent      Mood: anxious      Level of consciousness:  Alert, Oriented x4 and Attentive      Response to Learning: Progressing to goal      Endings: None Reported    Modes of Intervention: Support, Socialization, Exploration and Clarifying      Discipline Responsible: /Counselor      Signature:  CHAGO Khan LSW

## 2020-01-23 NOTE — GROUP NOTE
Group Therapy Note    Date: 1/23/2020    Group Start Time: 0900  Group End Time: 3630  Group Topic: Community Meeting    HEMANT Nayak        Group Therapy Note    Attendees: 10         Patient's Goal:  To improve goal setting skills     Notes:   Pt was pleasant and participated well     Status After Intervention:  Improved    Participation Level:  Active Listener    Participation Quality: Appropriate      Speech:  normal      Thought Process/Content: Logical      Affective Functioning: Congruent      Mood: euthymic      Level of consciousness:  Alert      Response to Learning: Able to verbalize current knowledge/experience and Progressing to goal      Endings: None Reported    Modes of Intervention: Education, Support and Problem-solving      Discipline Responsible: Psychoeducational Specialist      Signature:  Sukhdeep Macias

## 2020-01-23 NOTE — GROUP NOTE
Group Therapy Note    Date: 1/23/2020    Group Start Time: 1600  Group End Time: 5047  Group Topic: Recovery    [facility]    Vivien Dennis RN        Group Therapy Note    Attendees: 6/17           Status After Intervention:  Improved    Participation Level:  Active Listener    Participation Quality: Appropriate      Speech:  normal      Thought Process/Content: Logical      Affective Functioning: Congruent      Mood: anxious and depressed      Level of consciousness:  Alert      Response to Learning: Able to verbalize current knowledge/experience      Endings: None Reported    Modes of Intervention: Support      Discipline Responsible: Registered Nurse      Signature:  Vivien Dennis RN

## 2020-01-24 PROCEDURE — 99232 SBSQ HOSP IP/OBS MODERATE 35: CPT | Performed by: PSYCHIATRY & NEUROLOGY

## 2020-01-24 PROCEDURE — 1240000000 HC EMOTIONAL WELLNESS R&B

## 2020-01-24 PROCEDURE — 99232 SBSQ HOSP IP/OBS MODERATE 35: CPT | Performed by: NURSE PRACTITIONER

## 2020-01-24 PROCEDURE — 6370000000 HC RX 637 (ALT 250 FOR IP): Performed by: NURSE PRACTITIONER

## 2020-01-24 RX ORDER — TRAZODONE HYDROCHLORIDE 50 MG/1
25 TABLET ORAL NIGHTLY PRN
Status: DISCONTINUED | OUTPATIENT
Start: 2020-01-24 | End: 2020-01-29 | Stop reason: HOSPADM

## 2020-01-24 RX ADMIN — IBUPROFEN 800 MG: 800 TABLET, FILM COATED ORAL at 09:00

## 2020-01-24 RX ADMIN — QUETIAPINE FUMARATE 50 MG: 50 TABLET ORAL at 09:00

## 2020-01-24 RX ADMIN — QUETIAPINE FUMARATE 100 MG: 100 TABLET ORAL at 20:39

## 2020-01-24 ASSESSMENT — PAIN SCALES - GENERAL
PAINLEVEL_OUTOF10: 0
PAINLEVEL_OUTOF10: 3

## 2020-01-24 NOTE — PLAN OF CARE
Problem: Altered Mood, Depressive Behavior:  Goal: Able to verbalize and/or display a decrease in depressive symptoms  Description  Able to verbalize and/or display a decrease in depressive symptoms  Outcome: Ongoing     Problem: Altered Mood, Depressive Behavior:  Goal: Ability to disclose and discuss suicidal ideas will improve  Description  Ability to disclose and discuss suicidal ideas will improve  Outcome: Ongoing     Patient is alert and oriented. Appetite is good. Patient reports sleeping well. Denies suicidal and homicidal ideations. Denies depression and anxiety. Patient is isolative to self. Flat affect. Free from self harm behaviors, 15 minute visual checks maintained for safety.

## 2020-01-24 NOTE — GROUP NOTE
Group Therapy Note    Date: 1/24/2020    Group Start Time: 1000  Group End Time: 5157  Group Topic: Cognitive Skills    JEREMY BHHEMANT Terrell        Group Therapy Note    Attendees: 6/9         Patient's Goal:  To increase social interaction and to practice decision making and verbal and non verbal communication. Notes: Pt participated fully in group task . Pt was able to practice decision making and verbal and non verbal communication skills independently . Status After Intervention:  Improved     Participation Level:  Active Listener and Interactive     Participation Quality: Appropriate, Attentive, Sharing and Supportive        Speech:  normal        Thought Process/Content: Logical  Linear        Affective Functioning: Blunted        Mood: euthymic        Level of consciousness:  Alert, Oriented x4 and Attentive        Response to Learning: Able to verbalize current knowledge/experience, Able to verbalize/acknowledge new learning, Able to retain information and Progressing to goal        Endings: None Reported     Modes of Intervention: Education, Support, Socialization, Exploration, Clarifying and Problem-solving        Discipline Responsible: Psychoeducational Specialist        Signature:  Yancy Batres

## 2020-01-24 NOTE — PROGRESS NOTES
Department of Psychiatry  Attending Progress Note  Chief Complaint: Bipolar I disorder with depression ([facility] [address]     SUBJECTIVE:    Charlotte Pennington is a 22 y.o. female who was involuntarily admitted to [facility] following poor sleep and increased agitation. She has been non-medication compliant. Patient is observed today in the dayroom in coloring with other residents, and was also seen in her room. She reports having poor memory, being forgetful with things. She discusses recent police involvement, trying to find her brother's shoes and contacting her landlord. She has a history of developmental delay, borderline and trauma history. She has significant thought blocking with delayed speech. She reports living at home with her mother. She denies any pain today. She denies active SI, HI and audio/visual hallucinations. Her medications have been restarted. She states that she is a drug addict and has issues with cannabis. She is requesting that her medications be divided up. She agrees that Trazodone helps her sleep. At this time there is no safe alternative other than inpatient care. OBJECTIVE    Physical  /74   Pulse 111   Temp 98.1 °F (36.7 °C)   Resp 14   Ht 5' 1\" (1.549 m)   Wt 212 lb (96.2 kg)   LMP  (Within Months)   SpO2 97%   Breastfeeding No   BMI 40.06 kg/m²      Mental Status Evaluation:  Orientation: alertness: alert   Mood:. depressed      Affect:  blunted and constricted      Appearance:   Thick muscled, age appropriate and casually dressed   Activity:  Psychomotor Retardation   Gait/Posture: Normal   Speech:  increased latency of response   Thought Process:  blocked   Thought Content:  obsessions   Sensorium:  person, place, time/date and situation   Cognition:  grossly intact   Memory: Intact fair   Insight:  limited   Judgment: limited   Suicidal Ideations: negative   Homicidal Ideations: Negative for homicidal ideation      Medication Side Effects: absent       Attention Span attention span medications for efficacy and side effects. · Engage in therapeutic activities and groups. · Continue Seroquel and Trazodone. · Follow up at [facility] after symptoms stabilized.     Electronically signed by ANAMIKA Disla CNP on 1/24/2020 at 2:56 PM.

## 2020-01-24 NOTE — PROGRESS NOTES
[employer] service: Not on file     Active member of club or organization: Not on file     Attends meetings of clubs or organizations: Not on file     Relationship status: Not on file    Intimate partner violence:     Fear of current or ex partner: Not on file     Emotionally abused: Not on file     Physically abused: Not on file     Forced sexual activity: Not on file   Other Topics Concern    Not on file   Social History Narrative    ** Merged History Encounter **                ROS:  [x] All negative/unchanged except if checked.  Explain positive(checked items) below:  [] Constitutional  [] Eyes  [] Ear/Nose/Mouth/Throat  [] Respiratory  [] CV  [] GI  []   [] Musculoskeletal  [] Skin/Breast  [] Neurological  [] Endocrine  [] Heme/Lymph  [] Allergic/Immunologic    Explanation:     MEDICATIONS:    Current Facility-Administered Medications:     acetaminophen (TYLENOL) tablet 650 mg, 650 mg, Oral, Q4H PRN, Luci Hay, APRN - CNP, 650 mg at 01/22/20 1156    benztropine mesylate (COGENTIN) injection 2 mg, 2 mg, Intramuscular, BID PRN, Luci Hay, APRN - CNP    magnesium hydroxide (MILK OF MAGNESIA) 400 MG/5ML suspension 30 mL, 30 mL, Oral, Daily PRN, Luci Hay, APRN - CNP    aluminum & magnesium hydroxide-simethicone (MAALOX) 200-200-20 MG/5ML suspension 30 mL, 30 mL, Oral, Q6H PRN, Luci Hay, APRN - CNP    nicotine (NICODERM CQ) 14 MG/24HR 1 patch, 1 patch, Transdermal, Daily, Luci Hay, APRN - CNP, 1 patch at 01/22/20 1438    hydrOXYzine (ATARAX) tablet 25 mg, 25 mg, Oral, TID PRN, Luci Hay, APRN - CNP, 25 mg at 01/23/20 2141    ibuprofen (ADVIL;MOTRIN) tablet 800 mg, 800 mg, Oral, TID PRN, Luci Hay, APRN - CNP, 800 mg at 01/24/20 0900    QUEtiapine (SEROQUEL) tablet 50 mg, 50 mg, Oral, Daily, Luci Hay, APRN - CNP, 50 mg at 01/24/20 0900    QUEtiapine (SEROQUEL) tablet 100 mg, 100 mg, Oral, Nightly, Luci Hay, APRN - CNP, 100 mg at 01/23/20 2142    traZODone (DESYREL) tablet 50 mg, 50 mg, Oral, Nightly PRN, Edwin Young MD, 50 mg at 01/23/20 2141    nicotine polacrilex (NICORETTE) gum 2 mg, 2 mg, Oral, PRN, Edwin Young MD, 2 mg at 01/21/20 1503      Examination:  /74   Pulse 111   Temp 98.1 °F (36.7 °C)   Resp 14   Ht 5' 1\" (1.549 m)   Wt 212 lb (96.2 kg)   LMP  (Within Months)   SpO2 97%   Breastfeeding No   BMI 40.06 kg/m²   Gait - steady  Medication side effects(SE): too sleepy with adding Trazodone    Mental Status Examination:    Level of consciousness:  within normal limits   Appearance:  fair grooming and fair hygiene  Behavior/Motor:  no abnormalities noted  Attitude toward examiner:  cooperative  Speech:  spontaneous and normal rate   Mood: anxious and constricted  Affect:  blunted  Thought processes:  linear and goal directed   Thought content:  Suicidal Ideation:  denies suicidal ideation  Cognition:  oriented to person, place, and time   Concentration intact  Insight fair   Judgement poor     ASSESSMENT:   Patient symptoms are:  [] Well controlled  [x] Improving  [] Worsening  [] No change      Diagnosis:   Active Problems:    Psychotic episode ([facility] [address]    [facility]    Borderline personality disorder ([facility]  Resolved Problems:    * No resolved hospital problems. *      LABS:    No results for input(s): WBC, HGB, PLT in the last 72 hours. No results for input(s): NA, K, CL, CO2, BUN, CREATININE, GLUCOSE in the last 72 hours. No results for input(s): BILITOT, ALKPHOS, AST, ALT in the last 72 hours.   Lab Results   Component Value Date    BARBSCNU NEGATIVE 12/28/2014    LABBENZ NEGATIVE 12/28/2014    LABMETH NEGATIVE 12/28/2014    PPXUR NOT REPORTED 12/28/2014     Lab Results   Component Value Date    TSH 3.25 05/15/2017     No results found for: LITHIUM  No results found for: VALPROATE, CBMZ    RISK ASSESSMENT: Moderate risk of suicide    Treatment Plan:  Reviewed current Medications with the patient. Will reduce Trazodone PRN to 25mg qhs. Risks, benefits, side effects, drug-to-drug interactions and alternatives to treatment were discussed. The patient  consented to treatment. Encourage patient to attend group and other milieu activities. Discharge planning discussed with the patient and treatment team.    PSYCHOTHERAPY/COUNSELING:  [] Therapeutic interview  [x] Supportive  [] CBT  [] Ongoing  [] Other    [x] Patient continues to need, on a daily basis, active treatment furnished directly by or requiring the supervision of inpatient psychiatric personnel      Anticipated Length of stay: 3 - 4 days.              Electronically signed by Dami Magaña MD on 1/24/2020 at 11:44 AM

## 2020-01-24 NOTE — GROUP NOTE
Group Therapy Note    Date: 1/24/2020    Group Start Time: 1100  Group End Time: 6979  Group Topic: Psychotherapy    [facility]    Amado Rene        Group Therapy Note    Attendees:6/10         Patient's Goal: PT will demonstrate increased interpersonal interaction and a clear understanding on multiple types of coping skills relating to the here-and-now therapeutic practice. Notes: Patient is making progress, AEB participating in group discussion, actively listening, and supporting other group members. PT participates in group and encourages others to participate. PT was actively involved in safety planning process. Status After Intervention:  Unchanged    Participation Level: Active Listener and Interactive    Participation Quality: Appropriate, Attentive and Sharing      Speech:  normal      Thought Process/Content: Logical      Affective Functioning: Flat      Mood: depressed      Level of consciousness:  Alert, Oriented x4 and Attentive      Response to Learning: Able to verbalize current knowledge/experience and Progressing to goal      Endings: None Reported    Modes of Intervention: Support, Socialization, Exploration, Clarifying and Problem-solving      Discipline Responsible: /Counselor      Signature:   Amado Rene

## 2020-01-24 NOTE — PLAN OF CARE
Problem: Altered Mood, Depressive Behavior:  Goal: Ability to disclose and discuss suicidal ideas will improve  Description  Ability to disclose and discuss suicidal ideas will improve  1/23/2020 2228 by Matthew Maxwell LPN  Outcome: Met This Shift  Note:   Pt denies suicidal ideation at this time, Pt agrees to seek staff help should the thought of suicide arise. Staff will provide reassurance as needed. 15 minute checks maintained for patient safety. Problem: Altered Mood, Depressive Behavior:  Goal: Able to verbalize and/or display a decrease in depressive symptoms  Description  Able to verbalize and/or display a decrease in depressive symptoms  1/23/2020 2228 by Matthew Maxwell LPN  Outcome: Ongoing  Note:   Pt admits to depressive symptoms at this time, Pt is out in dayroom selectively social with a flat affect.

## 2020-01-24 NOTE — GROUP NOTE
Group Therapy Note    Date: 1/24/2020    Group Start Time: 1330  Group End Time: 9931  Group Topic: Relapse Prevention    [facility]    Leah Desouza, NICCIS        Group Therapy Note    Attendees: 10         Patient's Goal: using creative expression as a relaxation technique    Notes:   Pt was pleasant and participated well     Status After Intervention:  Improved    Participation Level:  Active Listener    Participation Quality: Appropriate      Speech:  normal      Thought Process/Content: Logical      Affective Functioning: Congruent      Mood: euthymic      Level of consciousness:  Alert      Response to Learning: Able to verbalize current knowledge/experience and Progressing to goal      Endings: None Reported    Modes of Intervention: Education, Support and Problem-solving      Discipline Responsible: Psychoeducational Specialist      Signature:  Josiah Taylor

## 2020-01-25 PROCEDURE — 6370000000 HC RX 637 (ALT 250 FOR IP): Performed by: NURSE PRACTITIONER

## 2020-01-25 PROCEDURE — 99232 SBSQ HOSP IP/OBS MODERATE 35: CPT | Performed by: NURSE PRACTITIONER

## 2020-01-25 PROCEDURE — 6370000000 HC RX 637 (ALT 250 FOR IP): Performed by: PSYCHIATRY & NEUROLOGY

## 2020-01-25 PROCEDURE — 1240000000 HC EMOTIONAL WELLNESS R&B

## 2020-01-25 RX ADMIN — HYDROXYZINE HYDROCHLORIDE 25 MG: 25 TABLET, FILM COATED ORAL at 21:13

## 2020-01-25 RX ADMIN — QUETIAPINE FUMARATE 100 MG: 100 TABLET ORAL at 21:13

## 2020-01-25 RX ADMIN — QUETIAPINE FUMARATE 50 MG: 50 TABLET ORAL at 08:35

## 2020-01-25 RX ADMIN — TRAZODONE HYDROCHLORIDE 25 MG: 50 TABLET ORAL at 01:29

## 2020-01-25 RX ADMIN — TRAZODONE HYDROCHLORIDE 25 MG: 50 TABLET ORAL at 21:13

## 2020-01-25 NOTE — GROUP NOTE
Group Therapy Note    Date: 1/25/2020    Group Start Time: 1000  Group End Time: 1941  Group Topic: Psychoeducation    [facility]    Patient refused to attend coping skills group at 1000 after encouragement from staff. 1:1 talk time offered by staff as alternative to group session.

## 2020-01-25 NOTE — GROUP NOTE
Group Therapy Note    Date: 1/25/2020    Group Start Time: 0130  Group End Time: 1502  Group Topic: Psychoeducation    [facility]    Alfonso Guillen        Group Therapy Note    Attendees: 10/17         Patient's Goal:  To participate actively in educational group discussion using conversation cubes. Notes:  :  Patient is making progress, AEB participating in group discussion, actively listening, and supporting other group members. PT participates in group and encourages others to participate     Status After Intervention:  Improved    Participation Level: Active Listener and Interactive    Participation Quality: Appropriate, Attentive and Sharing      Speech:  pressured      Thought Process/Content: Flight of ideas      Affective Functioning: Flat      Mood: stable      Level of consciousness:  Alert, Oriented x4 and Attentive      Response to Learning: Able to verbalize current knowledge/experience and Progressing to goal      Endings: None Reported    Modes of Intervention: Education, Support, Socialization and Exploration      Discipline Responsible: /Counselor      Signature:   Alfonso Guillen

## 2020-01-25 NOTE — PROGRESS NOTES
ideation      Medication Side Effects: absent       Attention Span attention span appeared shorter than expected for age       Labs  No results found for this or any previous visit (from the past 67 hour(s)).     Medications  Current Facility-Administered Medications   Medication Dose Route Frequency Provider Last Rate Last Dose    traZODone (DESYREL) tablet 25 mg  25 mg Oral Nightly PRN Nas Brooks MD   25 mg at 01/25/20 0129    acetaminophen (TYLENOL) tablet 650 mg  650 mg Oral Q4H PRN Smiley Oliva APRN - CNP   650 mg at 01/22/20 1156    benztropine mesylate (COGENTIN) injection 2 mg  2 mg Intramuscular BID PRN ANAMIKA Polanco - CNP        magnesium hydroxide (MILK OF MAGNESIA) 400 MG/5ML suspension 30 mL  30 mL Oral Daily PRN ANAMIKA Polanco - CNP        aluminum & magnesium hydroxide-simethicone (MAALOX) 200-200-20 MG/5ML suspension 30 mL  30 mL Oral Q6H PRN ANAMIKA Polanco - CNP        nicotine (NICODERM CQ) 14 MG/24HR 1 patch  1 patch Transdermal Daily ANAMIKA Polanco - CNP   1 patch at 01/22/20 1438    hydrOXYzine (ATARAX) tablet 25 mg  25 mg Oral TID PRN Smiley Oliva APRN - CNP   25 mg at 01/23/20 2141    ibuprofen (ADVIL;MOTRIN) tablet 800 mg  800 mg Oral TID PRN Smiley Oliva, APRN - CNP   800 mg at 01/24/20 0900    QUEtiapine (SEROQUEL) tablet 50 mg  50 mg Oral Daily ANAMIKA Polanco - CNP   50 mg at 01/25/20 9515    QUEtiapine (SEROQUEL) tablet 100 mg  100 mg Oral Nightly ANAMIKA Polanco - CNP   100 mg at 01/24/20 2039    nicotine polacrilex (NICORETTE) gum 2 mg  2 mg Oral PRN Jose Elias Monroe MD   2 mg at 01/21/20 4633         nicotine  1 patch Transdermal Daily    QUEtiapine  50 mg Oral Daily    QUEtiapine  100 mg Oral Nightly       ASSESSMENT  Bipolar I disorder with depression ([facility] [address]     Patient's Response to Treatment: fair    PLAN    · Continue inpatient psychiatric treatment  · Supportive therapy with medication management. Reviewed risks and benefits as well as potential side effects with patient. · Review medications for efficacy and side effects. · Engage in therapeutic activities and groups. · Continue Seroquel and Trazodone. · Follow up at [facility] after symptoms stabilized.     Electronically signed by ANAMIKA Huffman CNP on 1/25/2020 at 4:28 PM.

## 2020-01-25 NOTE — PLAN OF CARE
Problem: Altered Mood, Depressive Behavior:  Goal: Ability to disclose and discuss suicidal ideas will improve  Description  Ability to disclose and discuss suicidal ideas will improve  1/25/2020 0909 by Mainor Goodman RN  Note:   Patient denies any thoughts of self harm or hallucinations. Out for meals. Needs to shower but did not this morning. Medication compliant and behavior controlled.

## 2020-01-25 NOTE — PLAN OF CARE
Problem: Altered Mood, Depressive Behavior:  Goal: Able to verbalize and/or display a decrease in depressive symptoms  Description  Able to verbalize and/or display a decrease in depressive symptoms  1/24/2020 2135 by Julia Estrella LPN  Outcome: Ongoing  Note:   Patient admits to depressive symptoms stating it is due to wanting to leave her job and not sure if disability is going to be approved. Pt has a flat affect, aloof of peers when on unit. Problem: Altered Mood, Depressive Behavior:  Goal: Ability to disclose and discuss suicidal ideas will improve  Description  Ability to disclose and discuss suicidal ideas will improve  1/24/2020 2135 by Julia Estrella LPN  Outcome: Ongoing  Note:   Pt admits to fleeting suicidal ideation with out a plan, Pt contracts for safety agreeing to seek staff help should the urge to act out on these feelings occur. 15 minute checks maintained for patient safety staff will provided support and reassurance as needed.

## 2020-01-26 LAB — GLUCOSE BLD-MCNC: 97 MG/DL (ref 65–105)

## 2020-01-26 PROCEDURE — 1240000000 HC EMOTIONAL WELLNESS R&B

## 2020-01-26 PROCEDURE — 99232 SBSQ HOSP IP/OBS MODERATE 35: CPT | Performed by: NURSE PRACTITIONER

## 2020-01-26 PROCEDURE — 6370000000 HC RX 637 (ALT 250 FOR IP): Performed by: NURSE PRACTITIONER

## 2020-01-26 PROCEDURE — 82947 ASSAY GLUCOSE BLOOD QUANT: CPT

## 2020-01-26 PROCEDURE — 6370000000 HC RX 637 (ALT 250 FOR IP): Performed by: PSYCHIATRY & NEUROLOGY

## 2020-01-26 RX ADMIN — TRAZODONE HYDROCHLORIDE 25 MG: 50 TABLET ORAL at 21:13

## 2020-01-26 RX ADMIN — QUETIAPINE FUMARATE 50 MG: 50 TABLET ORAL at 09:05

## 2020-01-26 RX ADMIN — HYDROXYZINE HYDROCHLORIDE 25 MG: 25 TABLET, FILM COATED ORAL at 21:13

## 2020-01-26 RX ADMIN — QUETIAPINE FUMARATE 100 MG: 100 TABLET ORAL at 21:13

## 2020-01-26 RX ADMIN — HYDROXYZINE HYDROCHLORIDE 25 MG: 25 TABLET, FILM COATED ORAL at 14:53

## 2020-01-26 RX ADMIN — SERTRALINE HYDROCHLORIDE 50 MG: 50 TABLET ORAL at 14:53

## 2020-01-26 NOTE — GROUP NOTE
Group Therapy Note    Date: 1/26/2020    Group Start Time: 1330  Group End Time: 8402  Group Topic: Psychoeducation    [facility] BENJI Hernandez        Group Therapy Note    Attendees: 10/21    Patient's Goal:  Increase interpersonal relationships and express ourselves through positive music lyrics     Notes:  Patient offered support and insight     Status After Intervention: Improved     Participation Level:  Active Listener and Interactive     Participation Quality: Appropriate, Attentive and Sharing     Speech:  Normal     Thought Process/Content: Logical     Affective Functioning: Congruent     Mood: Euthymic     Level of consciousness:  Alert, Oriented x4 and Attentive     Response to Learning: Able to verbalize current knowledge/experience, Able to verbalize/acknowledge new learning, Able to retain information and Capable of insight     Endings: None Reported     Modes of Intervention: Education, Socialization and Exploration     Discipline Responsible: /Counselor     Signature:  BENJI Mejia

## 2020-01-26 NOTE — GROUP NOTE
Group Therapy Note    Date: 1/26/2020    Group Start Time: 1000  Group End Time: 6447  Group Topic: Psychoeducation    [facility] LETTY Miles        Group Therapy Note             Patient refused to attend psychotherapy group after encouragement from staff. 1:1 talk time offered but refused. Signature:   Shayla Miles

## 2020-01-26 NOTE — GROUP NOTE
Group Therapy Note    Date: 1/26/2020    Group Start Time: 1600  Group End Time: 36  Group Topic: Group Documentation    [facility]    Felicia West RN        Group Therapy Note    Attendees:          Patient's Goal:  Learning to face anxiety    Notes:  Dealing with anxiety    Status After Intervention:  Unchanged    Participation Level:  Active Listener    Participation Quality: Attentive      Speech:  normal      Thought Process/Content: Logical      Affective Functioning: Congruent      Mood: depressed      Level of consciousness:  Alert      Response to Learning: Able to retain information      Endings: None Reported    Modes of Intervention: Education      Discipline Responsible: Registered Nurse      Signature:  Felicia West RN

## 2020-01-26 NOTE — GROUP NOTE
Group Therapy Note    Date: 1/25/2020    Group Start Time: 2030  Group End Time: 2045  Group Topic: Relaxation    [facility]    Juan Abrams LPN        Group Therapy Note    Attendees: 6/18         Patient's Goal:  Is progressing towards goal    Notes:      Status After Intervention:  Improved    Participation Level:  Active Listener    Participation Quality: Appropriate and Supportive      Speech:  normal      Thought Process/Content: Logical      Affective Functioning: Flat      Mood: WNL      Level of consciousness:  Alert and Oriented x4      Response to Learning: Able to verbalize current knowledge/experience      Endings: None Reported    Modes of Intervention: Support      Discipline Responsible: Licensed Practical Nurse      Signature:  Juan Abrams LPN

## 2020-01-26 NOTE — PROGRESS NOTES
Department of Psychiatry  Nurse Practitioner Progress Note    Chief Complaint: Bipolar I disorder with depression ([facility] [address]     SUBJECTIVE:  Patient is interviewed in the day area; she is tearful, flat and poorly reactive. She remains worried that the police will arrest her and that she will not be allowed to return to her mother's home. She is also tearful that others on the unit hate her because \"she locked them up\". Writer discussed reality-based thinking and walked her through the process. She was able to verbalize that some of her thoughts are not fact. Writer also encouraged her to meet with social work tomorrow and call the police to see if they have an active warrant for her arrest. She has a history of developmental delay, borderline and trauma history. She has significant thought blocking with delayed speech. She denies SI, HI and audio/visual hallucinations. She endorses depression with minimal change and increased anxiety. Chart and medications reviewed. Therapeutic support, empathetic care and psycho education provided. At this time there is no safe alternative other than inpatient care. OBJECTIVE    Physical  /68   Pulse 104   Temp 98.1 °F (36.7 °C) (Oral)   Resp 14   Ht 5' 1\" (1.549 m)   Wt 212 lb (96.2 kg)   LMP  (Within Months)   SpO2 97%   Breastfeeding No   BMI 40.06 kg/m²      Mental Status Evaluation:  Orientation: alertness: alert   Mood:. depressed      Affect:  blunted and constricted      Appearance:   Thick muscled, age appropriate and casually dressed   Activity:  Psychomotor Retardation   Gait/Posture: Normal   Speech:  increased latency of response   Thought Process:  blocked   Thought Content:  obsessions   Sensorium:  person, place, time/date and situation   Cognition:  grossly intact   Memory: Intact fair   Insight:  limited   Judgment: limited   Suicidal Ideations: negative   Homicidal Ideations: Negative for homicidal ideation      Medication Side Effects: absent Attention Span attention span appeared shorter than expected for age       Labs  Recent Results (from the past 67 hour(s))   POC Glucose Fingerstick    Collection Time: 01/26/20  7:49 AM   Result Value Ref Range    POC Glucose 97 65 - 105 mg/dL       Medications  Current Facility-Administered Medications   Medication Dose Route Frequency Provider Last Rate Last Dose    sertraline (ZOLOFT) tablet 50 mg  50 mg Oral Daily Orlean Porch, APRN - CNP        traZODone (DESYREL) tablet 25 mg  25 mg Oral Nightly PRN Etta Zelaya MD   25 mg at 01/25/20 2113    acetaminophen (TYLENOL) tablet 650 mg  650 mg Oral Q4H PRN Orlean Porch, APRN - CNP   650 mg at 01/22/20 1156    benztropine mesylate (COGENTIN) injection 2 mg  2 mg Intramuscular BID PRN Orlean Porch, APRN - CNP        magnesium hydroxide (MILK OF MAGNESIA) 400 MG/5ML suspension 30 mL  30 mL Oral Daily PRN Orlean Porch, APRN - CNP        aluminum & magnesium hydroxide-simethicone (MAALOX) 200-200-20 MG/5ML suspension 30 mL  30 mL Oral Q6H PRN Orlean Porch, APRN - CNP        nicotine (NICODERM CQ) 14 MG/24HR 1 patch  1 patch Transdermal Daily Orlean Porch, APRN - CNP   Stopped at 01/26/20 1132    hydrOXYzine (ATARAX) tablet 25 mg  25 mg Oral TID PRN Orlean Porch, APRN - CNP   25 mg at 01/25/20 2113    ibuprofen (ADVIL;MOTRIN) tablet 800 mg  800 mg Oral TID PRN Orlean Porch, APRN - CNP   800 mg at 01/24/20 0900    QUEtiapine (SEROQUEL) tablet 50 mg  50 mg Oral Daily Orlean Porch, APRN - CNP   50 mg at 01/26/20 6604    QUEtiapine (SEROQUEL) tablet 100 mg  100 mg Oral Nightly Orlean Porch, APRN - CNP   100 mg at 01/25/20 2113    nicotine polacrilex (NICORETTE) gum 2 mg  2 mg Oral PRN Zach Bishop MD   2 mg at 01/21/20 2353         sertraline  50 mg Oral Daily    nicotine  1 patch Transdermal Daily    QUEtiapine  50 mg Oral Daily    QUEtiapine  100 mg Oral Nightly       ASSESSMENT  Bipolar I disorder with depression ([facility] [address]     Patient's Response to Treatment: Slow    PLAN    · Continue inpatient psychiatric treatment. · New Order - Zoloft 50mg daily beginning 1/26/2020. · Supportive therapy with medication management. Reviewed risks and benefits as well as potential side effects with patient. · Review medications for efficacy and side effects. · Engage in therapeutic activities and groups. · Continue Seroquel and Trazodone. · Follow up at [facility] after symptoms stabilized.     Electronically signed by ANAMIKA Esparza CNP on 1/26/2020 at 2:34 PM.

## 2020-01-26 NOTE — GROUP NOTE
Group Therapy Note    Date: 1/26/2020    Group Start Time: 0915  Group End Time: 0930  Group Topic: Community Meeting    [facility] ALESSANDRA Hernandezbunnyjaved, [address]    Patient's Goal:  To demonstrate increased interpersonal interaction. Notes:  Pt attended and participated in group. Status After Intervention:  Improved    Participation Level:  Active Listener and Interactive    Participation Quality: Appropriate and Attentive      Speech:  normal      Thought Process/Content: Logical      Affective Functioning: Congruent      Mood: euthymic      Level of consciousness:  Alert and Attentive      Response to Learning: Progressing to goal      Endings: None Reported

## 2020-01-27 PROCEDURE — 6370000000 HC RX 637 (ALT 250 FOR IP): Performed by: NURSE PRACTITIONER

## 2020-01-27 PROCEDURE — 99232 SBSQ HOSP IP/OBS MODERATE 35: CPT | Performed by: NURSE PRACTITIONER

## 2020-01-27 PROCEDURE — 6370000000 HC RX 637 (ALT 250 FOR IP): Performed by: PSYCHIATRY & NEUROLOGY

## 2020-01-27 PROCEDURE — 1240000000 HC EMOTIONAL WELLNESS R&B

## 2020-01-27 RX ORDER — QUETIAPINE FUMARATE 50 MG/1
50 TABLET, FILM COATED ORAL DAILY
Status: DISCONTINUED | OUTPATIENT
Start: 2020-01-27 | End: 2020-01-29 | Stop reason: HOSPADM

## 2020-01-27 RX ORDER — QUETIAPINE FUMARATE 200 MG/1
200 TABLET, FILM COATED ORAL NIGHTLY
Status: DISCONTINUED | OUTPATIENT
Start: 2020-01-27 | End: 2020-01-29 | Stop reason: HOSPADM

## 2020-01-27 RX ADMIN — SERTRALINE HYDROCHLORIDE 50 MG: 50 TABLET ORAL at 08:58

## 2020-01-27 RX ADMIN — QUETIAPINE FUMARATE 50 MG: 50 TABLET ORAL at 08:59

## 2020-01-27 RX ADMIN — QUETIAPINE FUMARATE 200 MG: 200 TABLET ORAL at 20:13

## 2020-01-27 RX ADMIN — QUETIAPINE FUMARATE 50 MG: 50 TABLET, FILM COATED ORAL at 14:00

## 2020-01-27 RX ADMIN — TRAZODONE HYDROCHLORIDE 25 MG: 50 TABLET ORAL at 20:13

## 2020-01-27 RX ADMIN — ALUMINUM HYDROXIDE, MAGNESIUM HYDROXIDE, AND SIMETHICONE 30 ML: 200; 200; 20 SUSPENSION ORAL at 20:14

## 2020-01-27 NOTE — PLAN OF CARE
Problem: Tobacco Use:  Goal: Inpatient tobacco use cessation counseling participation  Description  Inpatient tobacco use cessation counseling participation  Outcome: Ongoing     Problem: Altered Mood, Depressive Behavior:  Goal: Able to verbalize and/or display a decrease in depressive symptoms  Description  Able to verbalize and/or display a decrease in depressive symptoms  Outcome: Ongoing  Note:   Patient is cooperative with a flat affect, tearful and thought blocking. Patient denies suicidal or homicidal ideations but verbalizes feelings of depression and anxiety but believes the medication is improving her symptoms. Programming is encouraged and education provided regarding med stabilization.      Problem: Altered Mood, Depressive Behavior:  Goal: Ability to disclose and discuss suicidal ideas will improve  Description  Ability to disclose and discuss suicidal ideas will improve  Outcome: Ongoing     Problem: Pain:  Goal: Pain level will decrease  Description  Pain level will decrease  Outcome: Ongoing     Problem: Pain:  Goal: Control of acute pain  Description  Control of acute pain  Outcome: Ongoing     Problem: Pain:  Goal: Control of chronic pain  Description  Control of chronic pain  Outcome: Ongoing

## 2020-01-27 NOTE — GROUP NOTE
Group Therapy Note    Date: 1/27/2020    Group Start Time: 1430  Group End Time: 5717  Group Topic: Cognitive Skills    [facility]    NICCI FriedmanS        Group Therapy Note    Attendees: 13/23         Patient's Goal:  To increase social interaction and to practice expressing feelings and exploring STRESS MANAGEMENT AND RELAXATION SKILLS     Notes: Pt participated fully in group task . Pt was able to practice expressing feelings and exploring stress management and relaxation skills using creative expression, sharing with group and discussion with group of peers and RT. Pt was supportive of peers and able to relate to some of their ideas and experiences. .      Status After Intervention:  Improved     Participation Level:  Active Listener and Interactive     Participation Quality: Appropriate, Attentive, Sharing and Supportive        Speech:  very softly spoken but shared at length      Thought Process/Content: Logical  Linear        Affective Functioning: Blunted        Mood: euthymic        Level of consciousness:  Alert, Oriented x4 and Attentive        Response to Learning: Able to verbalize current knowledge/experience, Able to verbalize/acknowledge new learning, Able to retain information and Progressing to goal        Endings: None Reported     Modes of Intervention: Education, Support, Socialization, Exploration, Clarifying and Problem-solving        Discipline Responsible: Psychoeducational Specialist        Signature:  Igor Berg, CTRS

## 2020-01-27 NOTE — PROGRESS NOTES
Department of Psychiatry  Attending Progress Note  Chief Complaint: Bipolar I disorder with depression ([facility] [address]     SUBJECTIVE: This is a 69-year-old female being seen for follow-up on the dual diagnosis unit at the [facility]. The patient was initially admitted from [facility] on a pink slip. She has since signed in on a voluntary basis. She was admitted due to increased agitation, poor sleep, and bizarre behaviors. The patient's nurse reports that the patient is not social.  She is aloof. She expressed hopeless and helpless feelings. Poor self worth and self outlook. Charting and documentation of been reviewed. There was a note that the patient had a positive urine culture at the previous hospital, but no documentation has been provided to date. She is been medication compliant. As needed Atarax has been administered twice in the past 24 hours as well as as needed trazodone. Patient seen in the sensory room today. She is bizarre. Very fixated on food and obtaining snacks during assessment. Difficult to redirect. Circumstantial and often returns to these thoughts/theme. She reported to this writer that she was [place] myself. \"  She expresses that she is not ordering as much as she would like to eat because \"I am trying to keep it cheaper for you. \"  During assessment she was noted to present with loose associations and flight of ideas. She was religiously preoccupied. She reports having \"anger issues,\" stating \"I lash out all the time. \"  Initially she denied any auditory and visual hallucinations, though this was difficult to have the patient answer. Eventually she did state \"I do not know if I see or hear it, or if it is just my thoughts, but God is here. He has returned. \"  Noted thought blocking during assessment. Outside of \"starving herself\" the patient denies any wishes of wanting to harm herself. She states sleep was \"pretty good. \"  Regarding her depression the patient states

## 2020-01-27 NOTE — GROUP NOTE
Group Therapy Note    Date: 1/27/2020    Group Start Time: 1600  Group End Time: 0757  Group Topic: Recovery    [facility]    Tobias Douglas LPN        Group Therapy Note    Attendees: 12/21         Patient's Goal:  participation    Notes:  participated appropriately    Status After Intervention:  Unchanged    Participation Level:  Active Listener    Participation Quality: Attentive      Speech:  normal      Thought Process/Content: Logical      Affective Functioning: Congruent      Mood: euthymic      Level of consciousness:  Alert      Response to Learning: Capable of insight      Endings: None Reported    Modes of Intervention: Support      Discipline Responsible: Licensed Practical Nurse      Signature:  Tobias Douglas LPN

## 2020-01-27 NOTE — GROUP NOTE
Group Therapy Note    Date: 1/27/2020    Group Start Time: 1000  Group End Time: 8461  Group Topic: Cognitive Skills    [facility]    Itzel Shaggy, CTRS        Group Therapy Note    Attendees: 6/12       Patient's Goal:  To increase social interaction and to practice decision making , impulse control, and concentration     Notes: Pt participated partially in group task -pt was pulled from group by NP and when pt returned was preoccupied with food and tearful r/t \"losing lease on my place\". Pt mumbles when speaking and is hard to understand at times due to thoughts are preoccupied and tangential . Pt needs prompts and cues to practice decision making , impulse control, and concentration. RT talked with  after group about pt concerns (and had encouraged pt to share also),  is in process of working with pt's family to discern what is real and perceived by pt so as to assist where needed. Pt thought process et mood appear to have worsened compared to when last seen by this RT on 1/24/2020    Status After Intervention:  Unchanged     Participation Level:  Active Listener and Interactive-with prompts et cues     Participation Quality: limited due to preoccupations        Speech:  mumbled, soft,barely audible at times        Thought Process/Content: preoccupied ,tangential     Affective Functioning: Blunted,tearful when talking about possible loss of apartment        Mood: dysthymic        Level of consciousness:  Alert      Response to Learning: Able to verbalize current knowledge/experience     Endings: None Reported     Modes of Intervention: Education, Support, Socialization, Exploration, Clarifying and Problem-solving        Discipline Responsible: Psychoeducational Specialist        Signature:  Ashley Teran

## 2020-01-27 NOTE — GROUP NOTE
Group Therapy Note    Date: 1/27/2020    Group Start Time: 0900  Group End Time: 0930  Group Topic: Community Meeting    HEMANT Rodriguez        Group Therapy Note    Attendees: 12         Patient's Goal:  To improve goal setting skills     Notes:   Pt was pleasant and particiated well     Status After Intervention:  Improved    Participation Level:  Active Listener    Participation Quality: Appropriate      Speech:  normal      Thought Process/Content: Logical      Affective Functioning: Congruent      Mood: euthymic      Level of consciousness:  Alert      Response to Learning: Able to verbalize current knowledge/experience and Progressing to goal      Endings: None Reported    Modes of Intervention: Education, Support and Problem-solving      Discipline Responsible: Psychoeducational Specialist      Signature:  Mery Sanchez

## 2020-01-28 LAB
-: ABNORMAL
AMORPHOUS: ABNORMAL
BACTERIA: ABNORMAL
BILIRUBIN URINE: NEGATIVE
CASTS UA: ABNORMAL /LPF
COLOR: YELLOW
COMMENT UA: ABNORMAL
CRYSTALS, UA: ABNORMAL /HPF
EPITHELIAL CELLS UA: ABNORMAL /HPF
GLUCOSE URINE: NEGATIVE
KETONES, URINE: NEGATIVE
LEUKOCYTE ESTERASE, URINE: ABNORMAL
MUCUS: ABNORMAL
NITRITE, URINE: NEGATIVE
OTHER OBSERVATIONS UA: ABNORMAL
PH UA: 6 (ref 5–8)
PROTEIN UA: NEGATIVE
RBC UA: ABNORMAL /HPF
RENAL EPITHELIAL, UA: ABNORMAL /HPF
SPECIFIC GRAVITY UA: 1.01 (ref 1–1.03)
TRICHOMONAS: ABNORMAL
TURBIDITY: CLEAR
URINE HGB: NEGATIVE
UROBILINOGEN, URINE: NORMAL
WBC UA: ABNORMAL /HPF
YEAST: ABNORMAL

## 2020-01-28 PROCEDURE — 87086 URINE CULTURE/COLONY COUNT: CPT

## 2020-01-28 PROCEDURE — 1240000000 HC EMOTIONAL WELLNESS R&B

## 2020-01-28 PROCEDURE — 6370000000 HC RX 637 (ALT 250 FOR IP): Performed by: NURSE PRACTITIONER

## 2020-01-28 PROCEDURE — 99232 SBSQ HOSP IP/OBS MODERATE 35: CPT | Performed by: NURSE PRACTITIONER

## 2020-01-28 PROCEDURE — 6370000000 HC RX 637 (ALT 250 FOR IP): Performed by: PSYCHIATRY & NEUROLOGY

## 2020-01-28 PROCEDURE — 81001 URINALYSIS AUTO W/SCOPE: CPT

## 2020-01-28 RX ORDER — CEPHALEXIN 250 MG/1
250 CAPSULE ORAL 2 TIMES DAILY
Status: DISCONTINUED | OUTPATIENT
Start: 2020-01-28 | End: 2020-01-29 | Stop reason: HOSPADM

## 2020-01-28 RX ORDER — CEPHALEXIN 250 MG/1
250 CAPSULE ORAL 2 TIMES DAILY
Qty: 8 CAPSULE | Refills: 0 | Status: SHIPPED | OUTPATIENT
Start: 2020-01-28 | End: 2020-02-01

## 2020-01-28 RX ORDER — QUETIAPINE FUMARATE 50 MG/1
50 TABLET, FILM COATED ORAL 2 TIMES DAILY
Qty: 28 TABLET | Refills: 0 | Status: SHIPPED | OUTPATIENT
Start: 2020-01-28

## 2020-01-28 RX ORDER — HYDROXYZINE HYDROCHLORIDE 25 MG/1
25 TABLET, FILM COATED ORAL 3 TIMES DAILY PRN
Qty: 28 TABLET | Refills: 0 | Status: SHIPPED | OUTPATIENT
Start: 2020-01-28 | End: 2020-02-11

## 2020-01-28 RX ORDER — TRAZODONE HYDROCHLORIDE 50 MG/1
25 TABLET ORAL NIGHTLY PRN
Qty: 14 TABLET | Refills: 0 | Status: SHIPPED | OUTPATIENT
Start: 2020-01-28 | End: 2020-02-11

## 2020-01-28 RX ORDER — QUETIAPINE FUMARATE 200 MG/1
200 TABLET, FILM COATED ORAL NIGHTLY
Qty: 14 TABLET | Refills: 0 | Status: SHIPPED | OUTPATIENT
Start: 2020-01-28 | End: 2020-02-11

## 2020-01-28 RX ADMIN — QUETIAPINE FUMARATE 200 MG: 200 TABLET ORAL at 21:29

## 2020-01-28 RX ADMIN — SERTRALINE HYDROCHLORIDE 50 MG: 50 TABLET ORAL at 08:30

## 2020-01-28 RX ADMIN — CEPHALEXIN 250 MG: 250 CAPSULE ORAL at 14:30

## 2020-01-28 RX ADMIN — QUETIAPINE FUMARATE 50 MG: 50 TABLET, FILM COATED ORAL at 14:10

## 2020-01-28 RX ADMIN — QUETIAPINE FUMARATE 50 MG: 50 TABLET ORAL at 08:30

## 2020-01-28 RX ADMIN — TRAZODONE HYDROCHLORIDE 25 MG: 50 TABLET ORAL at 21:28

## 2020-01-28 RX ADMIN — CEPHALEXIN 250 MG: 250 CAPSULE ORAL at 21:27

## 2020-01-28 NOTE — GROUP NOTE
Group Therapy Note    Date: 1/28/2020    Group Start Time: 2195  Group End Time: 3843  Group Topic: Cognitive Skills    HEMANT Cobb        Group Therapy Note    Attendees: 11/18         Patient's Goal:  To increase social interaction and to practice problem solving, and communication skills     Notes: Pt participated partially in group task . Pt was able to practice problem solving, and communication skills at intervals but was somewhat aloof with peers and preoccupied toward end of group- decreased focus compared to morning group . Status After Intervention:  Unchanged     Participation Level:  Active Listener and Interactive at intervals     Participation Quality:  Attentive, Sharing at intervals        Speech:  more seclusive to self et guarded compared to 1000am group        Thought Process/Content: preoccupied toward end of group,impaired concentration to task      Affective Functioning: Restricted        Mood: more aloof, seclusive to self        Level of consciousness:  Alert, and Attentive at intervals        Response to Learning: Able to verbalize current knowledge/experience, Able to verbalize/acknowledge new learning-impaired by decreased concentration,  and Progressing to goal        Endings: None Reported     Modes of Intervention: Education, Support, Socialization, Exploration, Clarifying and Problem-solving        Discipline Responsible: Psychoeducational Specialist        Signature:  HEMANT Hughes

## 2020-01-28 NOTE — PROGRESS NOTES
acute distress or discomfort noted at conclusion of assessment. No needs expressed. OBJECTIVE    Physical  /75   Pulse 91   Temp 97.8 °F (36.6 °C) (Oral)   Resp 16   Ht 5' 1\" (1.549 m)   Wt 212 lb (96.2 kg)   LMP  (Within Months)   SpO2 97%   Breastfeeding No   BMI 40.06 kg/m²      Mental Status Evaluation:  Orientation: alertness: alert   Mood:. Euthymic, improved      Affect:  mood-incongruent      Appearance:  age appropriate, casually dressed and overweight   Activity:  WNL   Gait/Posture: Normal   Speech:   Normal in rate rhythm tone and volume   Thought Process:   More organized and connected. No thought blocking noted today. Goal oriented. Thought Content:   No delusions noted. No hallucinations. Denies suicidal and homicidal ideation   Sensorium:  person, place and time/date   Cognition:  grossly intact   Memory: intact   Insight:   Improved   Judgment:  Improved   Suicidal Ideations:  Patient denies suicidal ideation   Homicidal Ideations: Negative for homicidal ideation      Medication Side Effects: absent       Attention Span  attention span was appropriate for age.        Labs  Recent Results (from the past 72 hour(s))   POC Glucose Fingerstick    Collection Time: 01/26/20  7:49 AM   Result Value Ref Range    POC Glucose 97 65 - 105 mg/dL   Urinalysis Reflex to Culture    Collection Time: 01/28/20 12:25 AM   Result Value Ref Range    Color, UA YELLOW YELLOW    Turbidity UA CLEAR CLEAR    Glucose, Ur NEGATIVE NEGATIVE    Bilirubin Urine NEGATIVE NEGATIVE    Ketones, Urine NEGATIVE NEGATIVE    Specific Gravity, UA 1.007 1.000 - 1.030    Urine Hgb NEGATIVE NEGATIVE    pH, UA 6.0 5.0 - 8.0    Protein, UA NEGATIVE NEGATIVE    Urobilinogen, Urine Normal Normal    Nitrite, Urine NEGATIVE NEGATIVE    Leukocyte Esterase, Urine SMALL (A) NEGATIVE    Urinalysis Comments NOT REPORTED    Microscopic Urinalysis    Collection Time: 01/28/20 12:25 AM   Result Value Ref Range    - mg at 01/28/20 0830    nicotine polacrilex (NICORETTE) gum 2 mg  2 mg Oral PRN Marsha Paiz MD   2 mg at 01/21/20 5495         QUEtiapine  50 mg Oral Daily    QUEtiapine  200 mg Oral Nightly    sertraline  50 mg Oral Daily    nicotine  1 patch Transdermal Daily    QUEtiapine  50 mg Oral Daily       ASSESSMENT  Bipolar I disorder with depression ([facility] [address]     Patient's Response to Treatment: slowly    PLAN  · Continue inpatient psychiatric treatment  · Supportive therapy with medication management. Reviewed risks and benefits as well as potential side effects with patient. · Start patient on Keflex 500mg BID x 5 days for UTI. · Therapeutic activities and groups  · Follow up at [facility] [place] after symptoms stabilized     Estimated length of stay will be additional 2-3 days. Electronically signed by ANAMIKA Brice CNP on 1/28/2020 at 12:55 PM.    Dragon voice recognition software used in portions of this document.

## 2020-01-28 NOTE — GROUP NOTE
Group Therapy Note    Date: 1/28/2020    Group Start Time: 1430  Group End Time: 2920  Group Topic: Recovery    [facility]    CHAGO Biggs, BENJI        Group Therapy Note    Attendees: 13/23     Patient's Goal:  Increase understanding of addiction and recovery process. Notes:  Pt is making progress AEB participating in group discussion about the importance of relationships and support during early recovery. Status After Intervention:  Improved    Participation Level:  Active Listener and Interactive    Participation Quality: Appropriate, Attentive and Supportive      Speech:  normal      Thought Process/Content: Logical      Affective Functioning: Congruent      Mood: euthymic      Level of consciousness:  Alert, Oriented x4 and Attentive      Response to Learning: Progressing to goal      Endings: None Reported    Modes of Intervention: Education, Support, Socialization, Exploration, Clarifying and Problem-solving      Discipline Responsible: /Counselor      Signature:  CHAGO Biggs LSW, [place]

## 2020-01-29 VITALS
DIASTOLIC BLOOD PRESSURE: 84 MMHG | OXYGEN SATURATION: 97 % | RESPIRATION RATE: 14 BRPM | SYSTOLIC BLOOD PRESSURE: 136 MMHG | TEMPERATURE: 98.7 F | WEIGHT: 212 LBS | HEIGHT: 61 IN | HEART RATE: 121 BPM | BODY MASS INDEX: 40.02 KG/M2

## 2020-01-29 LAB
CULTURE: NORMAL
Lab: NORMAL
SPECIMEN DESCRIPTION: NORMAL

## 2020-01-29 PROCEDURE — 5130000000 HC BRIDGE APPOINTMENT

## 2020-01-29 PROCEDURE — 6370000000 HC RX 637 (ALT 250 FOR IP): Performed by: NURSE PRACTITIONER

## 2020-01-29 PROCEDURE — 99239 HOSP IP/OBS DSCHRG MGMT >30: CPT | Performed by: NURSE PRACTITIONER

## 2020-01-29 RX ADMIN — QUETIAPINE FUMARATE 50 MG: 50 TABLET ORAL at 09:33

## 2020-01-29 RX ADMIN — SERTRALINE HYDROCHLORIDE 50 MG: 50 TABLET ORAL at 09:33

## 2020-01-29 RX ADMIN — HYDROXYZINE HYDROCHLORIDE 25 MG: 25 TABLET, FILM COATED ORAL at 09:33

## 2020-01-29 RX ADMIN — CEPHALEXIN 250 MG: 250 CAPSULE ORAL at 09:35

## 2020-01-29 NOTE — GROUP NOTE
Group Therapy Note    Date: 1/29/2020    Group Start Time: 1100  Group End Time: 6149  Group Topic: Cognitive Skills    HEMANT Nayak        Group Therapy Note    Attendees: 10         Patient's Goal:  To improve cognitive skills     Notes:  Pt was pleasant and participated well     Status After Intervention:  Improved    Participation Level:  Active Listener    Participation Quality: Appropriate      Speech:  normal      Thought Process/Content: Logical      Affective Functioning: Congruent      Mood: euthymic      Level of consciousness:  Alert      Response to Learning: Able to verbalize current knowledge/experience and Progressing to goal      Endings: None Reported    Modes of Intervention: Education and Support      Discipline Responsible: Psychoeducational Specialist      Signature:  Sukhdeep Macias

## 2020-01-29 NOTE — GROUP NOTE
Group Therapy Note    Date: 2020    Group Start Time: 945  Group End Time:   Group Topic: Group Therapy    [facility]    Carmen Glass                 Patient's Goal:  ***    Notes:  ***    Status After Intervention:  {Status After Intervention:297437229}    Participation Level: {Participation Level:747429374}    Participation Quality: {[facility] PARTICIPATION QUALITY:306140662}      Speech:  {ED  CD_SPEECH:66609}      Thought Process/Content: {Thought Process/Content:029340507}      Affective Functioning: {Affective Functionin}      Mood: {Mood:377419025}      Level of consciousness:  {Level of consciousness:975736531}      Response to LearninNichole [facility]err[facility] Responses to Learnin}      Endings: {[facility] Endings:56060}    Modes of Intervention: {[facility] Modes of Intervention:565050806}      Discipline Responsible: Moses APARICIO Multidisciplinary:[id number]}      Signature:  Oskar Schultz

## 2020-01-29 NOTE — BH NOTE
Patient given tobacco quitline number 75347683749 at this time, refusing to call at this time, states \" I just dont want to quit now\"- patient given information as to the dangers of long term tobacco use. Continue to reinforce the importance of tobacco cessation.

## 2020-01-29 NOTE — FLOWSHEET NOTE
*Patient participated in the [address] [place] [address]       01/29/20 1417   Encounter Summary   Services provided to: Patient   Referral/Consult From: Rounding   Continue Visiting   (1/29/20)   Complexity of Encounter Moderate   Length of Encounter 30 minutes   Spiritual Assessment Completed Yes   Spiritual/Jewish   Type Spiritual support   Assessment Calm; Approachable   Intervention Active listening   Outcome Receptive; Expressed gratitude

## 2020-01-29 NOTE — DISCHARGE SUMMARY
during this admission. Discharge planning, findings, and recommendations were discussed with the patient and the treatment team.    Signed:  Carlos Shepherd   1/29/2020  1:23 PM    Dragon voice recognition software used in portions of this document.

## 2020-01-29 NOTE — PLAN OF CARE
Problem: Altered Mood, Depressive Behavior:  Goal: Able to verbalize and/or display a decrease in depressive symptoms  Description  Able to verbalize and/or display a decrease in depressive symptoms  1/29/2020 1128 by Natalia Riley  Outcome: Ongoing  Note:   Patient denies any suicidal or homicidal thoughts. Patient denies any audio or visual hallucinations. Patient states she is still depressed but doing better today. Ready to be discharged. Support and encouragement to find activities to relax her were given. Q 15 minute safety checks.

## 2023-06-07 NOTE — PLAN OF CARE
Patient denies suicidal ideation at this time, verbalizes depression. Showered. No issues with appetite, poor sleep. Fifteen minute checks maintained for patient safety. normal appearance